# Patient Record
Sex: FEMALE | Race: BLACK OR AFRICAN AMERICAN | HISPANIC OR LATINO | URBAN - METROPOLITAN AREA
[De-identification: names, ages, dates, MRNs, and addresses within clinical notes are randomized per-mention and may not be internally consistent; named-entity substitution may affect disease eponyms.]

---

## 2018-07-12 ENCOUNTER — APPOINTMENT (RX ONLY)
Dept: URBAN - METROPOLITAN AREA OTHER 10 | Facility: OTHER | Age: 63
Setting detail: DERMATOLOGY
End: 2018-07-12

## 2018-07-12 DIAGNOSIS — L24 IRRITANT CONTACT DERMATITIS: ICD-10-CM

## 2018-07-12 DIAGNOSIS — L63.8 OTHER ALOPECIA AREATA: ICD-10-CM

## 2018-07-12 PROBLEM — L24.9 IRRITANT CONTACT DERMATITIS, UNSPECIFIED CAUSE: Status: ACTIVE | Noted: 2018-07-12

## 2018-07-12 PROCEDURE — 99202 OFFICE O/P NEW SF 15 MIN: CPT | Mod: 25

## 2018-07-12 PROCEDURE — 11900 INJECT SKIN LESIONS </W 7: CPT

## 2018-07-12 PROCEDURE — ? COUNSELING

## 2018-07-12 PROCEDURE — ? PRESCRIPTION

## 2018-07-12 PROCEDURE — ? INTRALESIONAL KENALOG

## 2018-07-12 PROCEDURE — ? TREATMENT REGIMEN

## 2018-07-12 RX ORDER — HYDROCORTISONE 25 MG/G
CREAM TOPICAL BID
Qty: 30 | Refills: 0 | Status: ERX | COMMUNITY
Start: 2018-07-12

## 2018-07-12 RX ORDER — TACROLIMUS 1 MG/G
OINTMENT TOPICAL BID
Qty: 100 | Refills: 0 | Status: ERX | COMMUNITY
Start: 2018-07-12

## 2018-07-12 RX ORDER — KETOCONAZOLE 20 MG/G
CREAM TOPICAL BID
Qty: 60 | Refills: 0 | Status: ERX | COMMUNITY
Start: 2018-07-12

## 2018-07-12 RX ADMIN — KETOCONAZOLE: 20 CREAM TOPICAL at 00:00

## 2018-07-12 RX ADMIN — HYDROCORTISONE: 25 CREAM TOPICAL at 00:00

## 2018-07-12 RX ADMIN — TACROLIMUS: 1 OINTMENT TOPICAL at 00:00

## 2018-07-12 ASSESSMENT — LOCATION SIMPLE DESCRIPTION DERM
LOCATION SIMPLE: SCALP
LOCATION SIMPLE: RIGHT OCCIPITAL SCALP
LOCATION SIMPLE: VULVA
LOCATION SIMPLE: POSTERIOR SCALP

## 2018-07-12 ASSESSMENT — LOCATION ZONE DERM
LOCATION ZONE: VULVA
LOCATION ZONE: SCALP

## 2018-07-12 ASSESSMENT — LOCATION DETAILED DESCRIPTION DERM
LOCATION DETAILED: LEFT LABIA MAJORA
LOCATION DETAILED: MID-OCCIPITAL SCALP
LOCATION DETAILED: RIGHT SUPERIOR OCCIPITAL SCALP
LOCATION DETAILED: LEFT SUPERIOR PARIETAL SCALP
LOCATION DETAILED: RIGHT LABIA MAJORA

## 2018-07-12 NOTE — PROCEDURE: INTRALESIONAL KENALOG
Expiration Date For Kenalog (Optional): 05/2018
Consent: The risks of atrophy were reviewed with the patient.
Administered By (Optional): Kirstie Hameed PA-C
Detail Level: Detailed
Total Volume (Ccs): 3.5
Kenalog Preparation: Kenalog
Include Z78.9 (Other Specified Conditions Influencing Health Status) As An Associated Diagnosis?: No
Ndc# For Kenalog Only: 4162-8420-64
Lot # For Kenalog (Optional): [id number]
Concentration Of Kenalog Solution Injected (Mg/Ml): 10.0
X Size Of Lesion In Cm (Optional): 0
Medical Necessity Clause: This procedure was medically necessary because the lesions that were treated were:
Treatment Number (Optional): 1

## 2018-07-12 NOTE — HPI: HAIR LOSS
Previous Labs: Yes
How Did The Hair Loss Occur?: gradual in onset
How Severe Is Your Hair Loss?: moderate
What Hair Products Do You Use?: Kirstie larson
When Were The Labs Drawn? (Drawn...): 1/18
Lab Details: Normal

## 2018-07-12 NOTE — PROCEDURE: TREATMENT REGIMEN
Plan: Pt was advised to check with PCP to ensure they checked for thyroid issues and iron deficiency at last physical pt was advised if they didn’t then to call clinic and we will fill out a lab form for pt\\n\\n\\nPt was advised to hold off on coloring hair for 8 weeks
Detail Level: Simple
Plan: [place] skin diet
Initiate Treatment: Hydrocortisone and Ketaconazole cream bid x 2 weeks on vagina area\\nProtopic once daily on vagina area til next visit

## 2018-07-30 ENCOUNTER — APPOINTMENT (RX ONLY)
Dept: URBAN - METROPOLITAN AREA OTHER 10 | Facility: OTHER | Age: 63
Setting detail: DERMATOLOGY
End: 2018-07-30

## 2018-07-30 DIAGNOSIS — L24 IRRITANT CONTACT DERMATITIS: ICD-10-CM

## 2018-07-30 PROBLEM — L24.9 IRRITANT CONTACT DERMATITIS, UNSPECIFIED CAUSE: Status: ACTIVE | Noted: 2018-07-30

## 2018-07-30 PROCEDURE — ? COUNSELING

## 2018-07-30 PROCEDURE — ? PRESCRIPTION

## 2018-07-30 PROCEDURE — ? TREATMENT REGIMEN

## 2018-07-30 PROCEDURE — 99213 OFFICE O/P EST LOW 20 MIN: CPT

## 2018-07-30 RX ORDER — PREDNISONE 10 MG/1
TABLET ORAL
Qty: 20 | Refills: 0 | Status: ERX | COMMUNITY
Start: 2018-07-30

## 2018-07-30 RX ORDER — HYDROCORTISONE 25 MG/G
CREAM TOPICAL BID
Qty: 30 | Refills: 1 | Status: ERX

## 2018-07-30 RX ORDER — KETOCONAZOLE 20 MG/G
CREAM TOPICAL BID
Qty: 60 | Refills: 0 | Status: ERX

## 2018-07-30 RX ADMIN — PREDNISONE: 10 TABLET ORAL at 00:00

## 2018-07-30 ASSESSMENT — LOCATION DETAILED DESCRIPTION DERM
LOCATION DETAILED: LEFT LABIA MAJORA
LOCATION DETAILED: RIGHT LABIA MAJORA

## 2018-07-30 ASSESSMENT — LOCATION ZONE DERM: LOCATION ZONE: VULVA

## 2018-07-30 ASSESSMENT — LOCATION SIMPLE DESCRIPTION DERM: LOCATION SIMPLE: VULVA

## 2018-07-30 NOTE — PROCEDURE: TREATMENT REGIMEN
Discontinue Regimen: Protopic
Otc Regimen: Vaseline or aquaphor on area daily
Plan: Pt was resolved then switched to Protopic and had an adverse reaction. Will treat accordingly and re-evaluate in 4 weeks
Detail Level: Simple
Modify Regimen: Once prednisone has calmed area start hydrocortisone and Ketaconazole cream bid x 2 weeks
Continue Regimen: [place] skin diet
Initiate Treatment: Prednisone taper

## 2018-08-13 ENCOUNTER — APPOINTMENT (RX ONLY)
Dept: URBAN - METROPOLITAN AREA OTHER 10 | Facility: OTHER | Age: 63
Setting detail: DERMATOLOGY
End: 2018-08-13

## 2018-08-13 DIAGNOSIS — L29.8 OTHER PRURITUS: ICD-10-CM

## 2018-08-13 DIAGNOSIS — L63.8 OTHER ALOPECIA AREATA: ICD-10-CM | Status: IMPROVED

## 2018-08-13 DIAGNOSIS — L29.89 OTHER PRURITUS: ICD-10-CM

## 2018-08-13 PROCEDURE — ? COUNSELING

## 2018-08-13 PROCEDURE — 11900 INJECT SKIN LESIONS </W 7: CPT

## 2018-08-13 PROCEDURE — ? TREATMENT REGIMEN

## 2018-08-13 PROCEDURE — 99213 OFFICE O/P EST LOW 20 MIN: CPT | Mod: 25

## 2018-08-13 PROCEDURE — ? PRESCRIPTION

## 2018-08-13 PROCEDURE — ? INTRALESIONAL KENALOG

## 2018-08-13 RX ORDER — CLOBETASOL PROPIONATE 0.05 %
SHAMPOO TOPICAL
Qty: 118 | Refills: 2 | Status: ERX | COMMUNITY
Start: 2018-08-13

## 2018-08-13 RX ADMIN — Medication: at 00:00

## 2018-08-13 ASSESSMENT — LOCATION SIMPLE DESCRIPTION DERM
LOCATION SIMPLE: RIGHT OCCIPITAL SCALP
LOCATION SIMPLE: SCALP
LOCATION SIMPLE: POSTERIOR SCALP

## 2018-08-13 ASSESSMENT — LOCATION ZONE DERM: LOCATION ZONE: SCALP

## 2018-08-13 ASSESSMENT — LOCATION DETAILED DESCRIPTION DERM
LOCATION DETAILED: RIGHT SUPERIOR OCCIPITAL SCALP
LOCATION DETAILED: MID-OCCIPITAL SCALP
LOCATION DETAILED: LEFT SUPERIOR PARIETAL SCALP

## 2018-08-13 NOTE — PROCEDURE: TREATMENT REGIMEN
Detail Level: Simple
Plan: Pt was advised to check with PCP to ensure they checked for thyroid issues and iron deficiency at last physical pt was advised if they didn’t then to call clinic and we will fill out a lab form for pt\\n\\n
Otc Regimen: Pt already using sulfate free products.\\nGave pt a handout with ppd and advised to avoid hair dye with that chemical.
Plan: Pt states that pruritis usually occurs following sweating. There is no rash as of today. Pt already has a sulfate free regimen. Discussed possible allergy to ppd, but with no cutaneous evidence it is less likely that it is contact related. Will treat accordingly and re-evaluate.
Initiate Treatment: Clobex shampoo: wash scalp when irritated, up to 3-4 times per week

## 2018-08-13 NOTE — PROCEDURE: INTRALESIONAL KENALOG
Lot # For Kenalog (Optional): [id number]
Total Volume (Ccs): 0.6
Consent: The risks of atrophy were reviewed with the patient.
X Size Of Lesion In Cm (Optional): 0
Administered By (Optional): Kirstie Hameed PA-C
Treatment Number (Optional): 1
Expiration Date For Kenalog (Optional): 12/2019
Medical Necessity Clause: This procedure was medically necessary because the lesions that were treated were:
Detail Level: Detailed
Include Z78.9 (Other Specified Conditions Influencing Health Status) As An Associated Diagnosis?: No
Concentration Of Kenalog Solution Injected (Mg/Ml): 10.0
Ndc# For Kenalog Only: 0095-6195-26
Kenalog Preparation: Kenalog

## 2021-10-24 ENCOUNTER — LAB OUTSIDE AN ENCOUNTER (OUTPATIENT)
Dept: URBAN - METROPOLITAN AREA CLINIC 124 | Facility: CLINIC | Age: 66
End: 2021-10-24

## 2021-10-25 ENCOUNTER — OFFICE VISIT (OUTPATIENT)
Dept: URBAN - METROPOLITAN AREA CLINIC 124 | Facility: CLINIC | Age: 66
End: 2021-10-25
Payer: MEDICARE

## 2021-10-25 VITALS
WEIGHT: 153.2 LBS | BODY MASS INDEX: 26.15 KG/M2 | TEMPERATURE: 97.2 F | DIASTOLIC BLOOD PRESSURE: 95 MMHG | HEIGHT: 64 IN | SYSTOLIC BLOOD PRESSURE: 153 MMHG | HEART RATE: 76 BPM

## 2021-10-25 DIAGNOSIS — R05.3 CHRONIC COUGH: ICD-10-CM

## 2021-10-25 DIAGNOSIS — Z12.11 COLON CANCER SCREENING: ICD-10-CM

## 2021-10-25 DIAGNOSIS — G47.30 SLEEP APNEA, UNSPECIFIED TYPE: ICD-10-CM

## 2021-10-25 DIAGNOSIS — K21.9 GASTROESOPHAGEAL REFLUX DISEASE, UNSPECIFIED WHETHER ESOPHAGITIS PRESENT: ICD-10-CM

## 2021-10-25 DIAGNOSIS — Z83.71 FAMILY HISTORY OF COLONIC POLYPS: ICD-10-CM

## 2021-10-25 DIAGNOSIS — K59.04 CHRONIC IDIOPATHIC CONSTIPATION: ICD-10-CM

## 2021-10-25 PROCEDURE — 99204 OFFICE O/P NEW MOD 45 MIN: CPT | Performed by: INTERNAL MEDICINE

## 2021-10-25 RX ORDER — PANTOPRAZOLE SODIUM 40 MG/1
1 TABLET TABLET, DELAYED RELEASE ORAL ONCE A DAY
Qty: 90 TABLET | Refills: 3 | OUTPATIENT
Start: 2021-10-25

## 2021-10-25 RX ORDER — CYCLOBENZAPRINE HYDROCHLORIDE 10 MG/1
TABLET, FILM COATED ORAL
Qty: 0 | Refills: 0 | COMMUNITY
Start: 1900-01-01

## 2021-10-25 RX ORDER — HYDROCODONE BITARTRATE AND ACETAMINOPHEN 7.5; 325 MG/15ML; MG/15ML
SOLUTION ORAL
Qty: 0 | Refills: 0 | COMMUNITY
Start: 1900-01-01

## 2021-10-25 RX ORDER — ALPRAZOLAM 0.5 MG/1
TABLET ORAL
Qty: 0 | Refills: 0 | COMMUNITY
Start: 1900-01-01

## 2021-10-25 RX ORDER — POLYETHYLENE GLYOCOL 3350, SODIUM CHLORIDE, SODIUM BICARBONATE AND POTASSIUM CHLORIDE 420; 11.2; 5.72; 1.48 G/4L; G/4L; G/4L; G/4L
AS DIRECTED POWDER, FOR SOLUTION NASOGASTRIC; ORAL AS DIRECTED
Qty: 420 GRAM | Refills: 0 | OUTPATIENT
Start: 2021-10-24 | End: 2021-10-25

## 2021-10-25 RX ORDER — ONDANSETRON HYDROCHLORIDE 4 MG/1
1 TABLET AS NEEDED TABLET, FILM COATED ORAL ONCE A DAY
Qty: 2 | Refills: 0 | OUTPATIENT
Start: 2021-10-24

## 2021-10-25 RX ORDER — ZOLPIDEM TARTRATE 5 MG/1
TABLET, FILM COATED ORAL
Qty: 0 | Refills: 0 | COMMUNITY
Start: 1900-01-01

## 2021-10-25 NOTE — HPI-TODAY'S VISIT:
This is a 65-year-old female referred by Dr Andrews (A[facility])  for a GI consultation of colon cancer screening and GERD and a copy of this note was sent to the referring provider.  Upon review the chart, patient has been seeing Dr. Cb Hong and last saw him in 2016 for atypical chest pain and GERD.  She had been on Nexium in the past for GERD and was using a sleep machine at night for sleep apnea but complaining of chronic cough..  Patient also had chronic constipation.  Patient had an EGD on July 5, 2016 that revealed some gastric erythema and erythema of the lower esophagus biopsies were taken.  Patient had a colonoscopy as well on the same day for first-degree relative with history of polyps and constipation and this revealed internal hemorrhoids otherwise normal exam Dr. Hong recommended repeat exam in 5 years.  Biopsy of the stomach showed reactive gastropathy but no H. pylori and esophageal biopsy was normal.  Patient also had a barium swallow on September 20, 2016 that was normal.  CT scan of the abdomen was done on August 9, 2016 which revealed a normal gallbladder, normal liver, normal pancreas. Pt say she goes about every other day in terms of her bowels. Pt says the cough comes and goes. This year she saw ENT and pulmonary doctors. ENT did a full work up and negative. Pulmonary will f/u on a lung nodule. Pt has severe allergies and is being treated for this. Pt is much better on pantoprazole now.

## 2022-03-03 ENCOUNTER — OFFICE VISIT (OUTPATIENT)
Dept: URBAN - METROPOLITAN AREA SURGERY CENTER 16 | Facility: SURGERY CENTER | Age: 67
End: 2022-03-03

## 2022-08-18 ENCOUNTER — WEB ENCOUNTER (OUTPATIENT)
Dept: URBAN - METROPOLITAN AREA CLINIC 74 | Facility: CLINIC | Age: 67
End: 2022-08-18

## 2022-08-18 ENCOUNTER — OFFICE VISIT (OUTPATIENT)
Dept: URBAN - METROPOLITAN AREA CLINIC 74 | Facility: CLINIC | Age: 67
End: 2022-08-18
Payer: MEDICARE

## 2022-08-18 ENCOUNTER — LAB OUTSIDE AN ENCOUNTER (OUTPATIENT)
Dept: URBAN - METROPOLITAN AREA CLINIC 74 | Facility: CLINIC | Age: 67
End: 2022-08-18

## 2022-08-18 VITALS
SYSTOLIC BLOOD PRESSURE: 178 MMHG | HEIGHT: 64 IN | WEIGHT: 159.2 LBS | HEART RATE: 104 BPM | BODY MASS INDEX: 27.18 KG/M2 | DIASTOLIC BLOOD PRESSURE: 104 MMHG | OXYGEN SATURATION: 98 % | TEMPERATURE: 97.6 F

## 2022-08-18 DIAGNOSIS — K59.04 CHRONIC IDIOPATHIC CONSTIPATION: ICD-10-CM

## 2022-08-18 DIAGNOSIS — G47.30 SLEEP APNEA, UNSPECIFIED TYPE: ICD-10-CM

## 2022-08-18 DIAGNOSIS — Z83.71 FAMILY HISTORY OF COLONIC POLYPS: ICD-10-CM

## 2022-08-18 DIAGNOSIS — Z12.11 COLON CANCER SCREENING: ICD-10-CM

## 2022-08-18 DIAGNOSIS — K21.9 GASTROESOPHAGEAL REFLUX DISEASE, UNSPECIFIED WHETHER ESOPHAGITIS PRESENT: ICD-10-CM

## 2022-08-18 DIAGNOSIS — R05.3 CHRONIC COUGH: ICD-10-CM

## 2022-08-18 PROCEDURE — 99214 OFFICE O/P EST MOD 30 MIN: CPT | Performed by: INTERNAL MEDICINE

## 2022-08-18 RX ORDER — ONDANSETRON HYDROCHLORIDE 4 MG/1
1 TABLET AS NEEDED TABLET, FILM COATED ORAL ONCE A DAY
Qty: 2 | Refills: 0 | Status: DISCONTINUED | COMMUNITY
Start: 2021-10-24

## 2022-08-18 RX ORDER — METHYLPREDNISOLONE 4 MG/1
AS DIRECTED TABLET ORAL
Qty: 1 PACK | Refills: 1 | OUTPATIENT
Start: 2022-08-18 | End: 2022-08-28

## 2022-08-18 RX ORDER — BENZONATATE 100 MG/1
1 CAPSULE AS NEEDED CAPSULE ORAL THREE TIMES A DAY
Qty: 90 | Refills: 2 | OUTPATIENT
Start: 2022-08-18 | End: 2022-11-15

## 2022-08-18 RX ORDER — ZOLPIDEM TARTRATE 5 MG/1
TABLET, FILM COATED ORAL
Qty: 0 | Refills: 0 | Status: DISCONTINUED | COMMUNITY
Start: 1900-01-01

## 2022-08-18 RX ORDER — FLUTICASONE PROPIONATE 50 UG/1
1 SPRAY IN EACH NOSTRIL SPRAY, METERED NASAL ONCE A DAY
Status: ACTIVE | COMMUNITY

## 2022-08-18 RX ORDER — PANTOPRAZOLE SODIUM 40 MG/1
1 TABLET TABLET, DELAYED RELEASE ORAL ONCE A DAY
Qty: 90 TABLET | Refills: 3 | Status: ACTIVE | COMMUNITY
Start: 2021-10-25

## 2022-08-18 RX ORDER — HYDROCODONE BITARTRATE AND ACETAMINOPHEN 7.5; 325 MG/15ML; MG/15ML
SOLUTION ORAL
Qty: 0 | Refills: 0 | Status: DISCONTINUED | COMMUNITY
Start: 1900-01-01

## 2022-08-18 RX ORDER — ALPRAZOLAM 0.5 MG/1
TABLET ORAL
Qty: 0 | Refills: 0 | Status: DISCONTINUED | COMMUNITY
Start: 1900-01-01

## 2022-08-18 RX ORDER — CYCLOBENZAPRINE HYDROCHLORIDE 10 MG/1
TABLET, FILM COATED ORAL
Qty: 0 | Refills: 0 | Status: DISCONTINUED | COMMUNITY
Start: 1900-01-01

## 2022-08-18 NOTE — HPI-TODAY'S VISIT:
This is a 66-year-old female referred by Dr Allen for a GI consultation of colon cancer screening and GERD and a copy of this note was sent to the referring provider.  She had been on Nexium in the past for GERD and was using a CPAP machine at night for sleep apnea but complaining of chronic cough..  Patient also had chronic constipation.  Patient had an EGD on July 5, 2016 that revealed some gastric erythema and erythema of the lower esophagus biopsies were taken.  Patient had a colonoscopy as well on the same day for first-degree relative with history of polyps and constipation and this revealed internal hemorrhoids otherwise normal exam Dr. Hong recommended repeat exam in 5 years.  Biopsy of the stomach showed reactive gastropathy but no H. pylori and esophageal biopsy was normal.  Patient also had a barium swallow on September 20, 2016 that was normal.  CT scan of the abdomen was done on August 9, 2016 which revealed a normal gallbladder, normal liver, normal pancreas. Pt say she goes about every other day in terms of her bowels.Has chronic cough. very frustrated about it. This year she saw ENT and pulmonary doctors. ENT did a full work up and negative. Pulmonary will f/u on a lung nodule. Pt has severe allergies and is being treated for this. protonix helps GERD, but cough persists

## 2022-08-30 ENCOUNTER — TELEPHONE ENCOUNTER (OUTPATIENT)
Dept: URBAN - METROPOLITAN AREA CLINIC 74 | Facility: CLINIC | Age: 67
End: 2022-08-30

## 2022-10-03 ENCOUNTER — LAB OUTSIDE AN ENCOUNTER (OUTPATIENT)
Dept: URBAN - METROPOLITAN AREA CLINIC 74 | Facility: CLINIC | Age: 67
End: 2022-10-03

## 2022-10-03 ENCOUNTER — TELEPHONE ENCOUNTER (OUTPATIENT)
Dept: URBAN - METROPOLITAN AREA CLINIC 74 | Facility: CLINIC | Age: 67
End: 2022-10-03

## 2022-10-03 ENCOUNTER — OFFICE VISIT (OUTPATIENT)
Dept: URBAN - METROPOLITAN AREA CLINIC 74 | Facility: CLINIC | Age: 67
End: 2022-10-03
Payer: MEDICARE

## 2022-10-03 VITALS
TEMPERATURE: 97 F | HEIGHT: 64 IN | HEART RATE: 101 BPM | BODY MASS INDEX: 27.31 KG/M2 | SYSTOLIC BLOOD PRESSURE: 142 MMHG | OXYGEN SATURATION: 99 % | WEIGHT: 160 LBS | DIASTOLIC BLOOD PRESSURE: 82 MMHG

## 2022-10-03 DIAGNOSIS — Z83.71 FAMILY HISTORY OF COLONIC POLYPS: ICD-10-CM

## 2022-10-03 DIAGNOSIS — K59.04 CHRONIC IDIOPATHIC CONSTIPATION: ICD-10-CM

## 2022-10-03 DIAGNOSIS — R05.3 CHRONIC COUGH: ICD-10-CM

## 2022-10-03 DIAGNOSIS — G47.30 SLEEP APNEA, UNSPECIFIED TYPE: ICD-10-CM

## 2022-10-03 DIAGNOSIS — Z12.11 COLON CANCER SCREENING: ICD-10-CM

## 2022-10-03 DIAGNOSIS — K21.9 GASTROESOPHAGEAL REFLUX DISEASE, UNSPECIFIED WHETHER ESOPHAGITIS PRESENT: ICD-10-CM

## 2022-10-03 PROBLEM — 82934008: Status: ACTIVE | Noted: 2021-10-24

## 2022-10-03 PROBLEM — 73430006: Status: ACTIVE | Noted: 2021-10-25

## 2022-10-03 PROCEDURE — 99214 OFFICE O/P EST MOD 30 MIN: CPT | Performed by: INTERNAL MEDICINE

## 2022-10-03 RX ORDER — PANTOPRAZOLE SODIUM 40 MG/1
1 TABLET TABLET, DELAYED RELEASE ORAL ONCE A DAY
Qty: 90 TABLET | Refills: 3 | Status: ACTIVE | COMMUNITY
Start: 2021-10-25

## 2022-10-03 RX ORDER — FLUTICASONE PROPIONATE 50 UG/1
1 SPRAY IN EACH NOSTRIL SPRAY, METERED NASAL ONCE A DAY
Status: ACTIVE | COMMUNITY

## 2022-10-03 RX ORDER — BENZONATATE 100 MG/1
1 CAPSULE AS NEEDED CAPSULE ORAL THREE TIMES A DAY
Qty: 90 | Refills: 2 | Status: ON HOLD | COMMUNITY
Start: 2022-08-18 | End: 2022-11-15

## 2022-10-03 NOTE — HPI-TODAY'S VISIT:
This is a 66-year-old female referred by Dr Allen for a GI consultation of colon cancer screening and GERD and a copy of this note was sent to the referring provider.  She had been on Nexium in the past for GERD and was using a CPAP machine at night for sleep apnea but complaining of chronic cough..  Patient also had chronic constipation.  Patient had an EGD on July 5, 2016 that revealed some gastric erythema and erythema of the lower esophagus biopsies were taken.  Patient had a colonoscopy as well on the same day for first-degree relative with history of polyps and constipation and this revealed internal hemorrhoids otherwise normal exam Dr. Hong recommended repeat exam in 5 years.  Biopsy of the stomach showed reactive gastropathy but no H. pylori and esophageal biopsy was normal.  Patient also had a barium swallow on September 20, 2016 that was normal.  CT scan of the abdomen was done on August 9, 2016 which revealed a normal gallbladder, normal liver, normal pancreas. Pt say she goes about every other day in terms of her bowels.Has chronic cough. This year she saw ENT and pulmonary doctors. ENT did a full work up and negative. Pulmonary will f/u on a lung nodule. Pt has severe allergies and is being treated for this. protonix helps GERD. cough has improved a lot after medrol dosepak. not needing tessalon perles

## 2022-10-10 ENCOUNTER — TELEPHONE ENCOUNTER (OUTPATIENT)
Dept: URBAN - METROPOLITAN AREA SURGERY CENTER 30 | Facility: SURGERY CENTER | Age: 67
End: 2022-10-10

## 2022-10-24 ENCOUNTER — APPOINTMENT (RX ONLY)
Dept: URBAN - METROPOLITAN AREA OTHER 10 | Facility: OTHER | Age: 67
Setting detail: DERMATOLOGY
End: 2022-10-24

## 2022-10-24 DIAGNOSIS — L67.8 OTHER HAIR COLOR AND HAIR SHAFT ABNORMALITIES: ICD-10-CM

## 2022-10-24 PROCEDURE — 99213 OFFICE O/P EST LOW 20 MIN: CPT

## 2022-10-24 PROCEDURE — ? COUNSELING

## 2022-10-24 PROCEDURE — ? TREATMENT REGIMEN

## 2022-10-24 ASSESSMENT — LOCATION SIMPLE DESCRIPTION DERM: LOCATION SIMPLE: HAIR

## 2022-10-24 ASSESSMENT — LOCATION ZONE DERM: LOCATION ZONE: SCALP

## 2022-10-24 ASSESSMENT — LOCATION DETAILED DESCRIPTION DERM: LOCATION DETAILED: HAIR

## 2022-10-24 NOTE — PROCEDURE: COUNSELING
Detail Level: Zone
Patient Specific Counseling (Will Not Stick From Patient To Patient): Advised patient that this is likely due to over processing with chemical perm, keratin treatments and excessive heat application.  Instructed to avoid further chemical treatment or the application of high heat to the hair.

## 2022-10-24 NOTE — PROCEDURE: TREATMENT REGIMEN
Detail Level: Zone
Plan: Hair skin and nail vitamins, Vit D 1000 iu daily. increase protein intake (vital protein powder ) once daily, hair protein treatments( apoghee).  Wait 8-12 weeks before coloring.

## 2022-10-27 ENCOUNTER — OFFICE VISIT (OUTPATIENT)
Dept: URBAN - METROPOLITAN AREA SURGERY CENTER 30 | Facility: SURGERY CENTER | Age: 67
End: 2022-10-27
Payer: MEDICARE

## 2022-10-27 ENCOUNTER — CLAIMS CREATED FROM THE CLAIM WINDOW (OUTPATIENT)
Dept: URBAN - METROPOLITAN AREA CLINIC 4 | Facility: CLINIC | Age: 67
End: 2022-10-27
Payer: MEDICARE

## 2022-10-27 DIAGNOSIS — K63.5 BENIGN COLON POLYP: ICD-10-CM

## 2022-10-27 DIAGNOSIS — Z12.11 COLON CANCER SCREENING: ICD-10-CM

## 2022-10-27 DIAGNOSIS — K21.9 ACID REFLUX: ICD-10-CM

## 2022-10-27 DIAGNOSIS — K31.89 ACQUIRED DEFORMITY OF DUODENUM: ICD-10-CM

## 2022-10-27 DIAGNOSIS — K21.9 GASTRO-ESOPHAGEAL REFLUX DISEASE WITHOUT ESOPHAGITIS: ICD-10-CM

## 2022-10-27 DIAGNOSIS — K29.70 GASTRITIS, UNSPECIFIED, WITHOUT BLEEDING: ICD-10-CM

## 2022-10-27 PROCEDURE — 88305 TISSUE EXAM BY PATHOLOGIST: CPT | Performed by: PATHOLOGY

## 2022-10-27 PROCEDURE — 88313 SPECIAL STAINS GROUP 2: CPT | Performed by: PATHOLOGY

## 2022-10-27 PROCEDURE — 45380 COLONOSCOPY AND BIOPSY: CPT | Performed by: INTERNAL MEDICINE

## 2022-10-27 PROCEDURE — G8907 PT DOC NO EVENTS ON DISCHARG: HCPCS | Performed by: INTERNAL MEDICINE

## 2022-10-27 PROCEDURE — 43239 EGD BIOPSY SINGLE/MULTIPLE: CPT | Performed by: INTERNAL MEDICINE

## 2023-02-27 ENCOUNTER — OFFICE VISIT (OUTPATIENT)
Dept: URBAN - METROPOLITAN AREA CLINIC 74 | Facility: CLINIC | Age: 68
End: 2023-02-27

## 2023-03-22 ENCOUNTER — TELEPHONE ENCOUNTER (OUTPATIENT)
Dept: URBAN - METROPOLITAN AREA CLINIC 74 | Facility: CLINIC | Age: 68
End: 2023-03-22

## 2023-04-03 ENCOUNTER — OFFICE VISIT (OUTPATIENT)
Dept: URBAN - METROPOLITAN AREA CLINIC 74 | Facility: CLINIC | Age: 68
End: 2023-04-03

## 2023-04-10 ENCOUNTER — OFFICE VISIT (OUTPATIENT)
Dept: URBAN - METROPOLITAN AREA CLINIC 74 | Facility: CLINIC | Age: 68
End: 2023-04-10

## 2023-05-25 PROBLEM — 398311004: Status: ACTIVE | Noted: 2023-05-25

## 2023-05-25 PROBLEM — 266435005: Status: ACTIVE | Noted: 2023-05-25

## 2023-05-25 PROBLEM — 68154008: Status: ACTIVE | Noted: 2023-05-25

## 2023-05-25 PROBLEM — 89452002: Status: ACTIVE | Noted: 2023-05-25

## 2023-05-25 PROBLEM — 8493009: Status: ACTIVE | Noted: 2023-05-25

## 2023-06-01 ENCOUNTER — OFFICE VISIT (OUTPATIENT)
Dept: URBAN - METROPOLITAN AREA CLINIC 74 | Facility: CLINIC | Age: 68
End: 2023-06-01
Payer: MEDICARE

## 2023-06-01 ENCOUNTER — LAB OUTSIDE AN ENCOUNTER (OUTPATIENT)
Dept: URBAN - METROPOLITAN AREA CLINIC 74 | Facility: CLINIC | Age: 68
End: 2023-06-01

## 2023-06-01 VITALS
HEIGHT: 64 IN | BODY MASS INDEX: 26.12 KG/M2 | SYSTOLIC BLOOD PRESSURE: 122 MMHG | TEMPERATURE: 97.3 F | HEART RATE: 86 BPM | DIASTOLIC BLOOD PRESSURE: 80 MMHG | WEIGHT: 153 LBS

## 2023-06-01 DIAGNOSIS — K57.30 DIVERTICULOSIS OF COLON WITHOUT DIVERTICULITIS: ICD-10-CM

## 2023-06-01 DIAGNOSIS — K63.5 HYPERPLASTIC POLYP OF TRANSVERSE COLON: ICD-10-CM

## 2023-06-01 DIAGNOSIS — G47.30 SLEEP APNEA, UNSPECIFIED TYPE: ICD-10-CM

## 2023-06-01 DIAGNOSIS — K21.9 GERD WITHOUT ESOPHAGITIS: ICD-10-CM

## 2023-06-01 DIAGNOSIS — K29.50 CHRONIC GASTRITIS WITHOUT BLEEDING, UNSPECIFIED GASTRITIS TYPE: ICD-10-CM

## 2023-06-01 DIAGNOSIS — E66.3 OVERWEIGHT (BMI 25.0-29.9): ICD-10-CM

## 2023-06-01 DIAGNOSIS — Z83.71 FAMILY HISTORY OF COLONIC POLYPS: ICD-10-CM

## 2023-06-01 DIAGNOSIS — R05.3 CHRONIC COUGH: ICD-10-CM

## 2023-06-01 DIAGNOSIS — K76.9 LIVER LESION: ICD-10-CM

## 2023-06-01 PROBLEM — 162863004: Status: ACTIVE | Noted: 2023-06-01

## 2023-06-01 PROBLEM — 300331000: Status: ACTIVE | Noted: 2023-06-01

## 2023-06-01 PROCEDURE — 99213 OFFICE O/P EST LOW 20 MIN: CPT | Performed by: INTERNAL MEDICINE

## 2023-06-01 RX ORDER — FLUOXETINE HYDROCHLORIDE 40 MG/1
1 CAPSULE CAPSULE ORAL ONCE A DAY
Status: ACTIVE | COMMUNITY

## 2023-06-01 RX ORDER — FLUTICASONE PROPIONATE 50 UG/1
1 SPRAY IN EACH NOSTRIL SPRAY, METERED NASAL ONCE A DAY
Status: ACTIVE | COMMUNITY

## 2023-06-01 RX ORDER — PANTOPRAZOLE SODIUM 40 MG/1
1 TABLET TABLET, DELAYED RELEASE ORAL ONCE A DAY
Qty: 90 | Refills: 3
Start: 2021-10-25

## 2023-06-01 RX ORDER — ALPRAZOLAM 0.5 MG/1
1 TABLET TABLET ORAL TWICE A DAY
Status: ACTIVE | COMMUNITY

## 2023-06-01 NOTE — HPI-TODAY'S VISIT:
This is a 66-year-old female referred by Dr Allen for a GI consultation of colon cancer screening and GERD and a copy of this note was sent to the referring provider.  She had been on Nexium in the past for GERD and was using a CPAP machine at night for sleep apnea but complaining of chronic cough..  Patient also had chronic constipation.  Patient had an EGD on July 5, 2016 that revealed some gastric erythema and erythema of the lower esophagus biopsies were taken.  Patient had a colonoscopy as well on the same day for first-degree relative with history of polyps and constipation and this revealed internal hemorrhoids otherwise normal exam Dr. Hong recommended repeat exam in 5 years.  Biopsy of the stomach showed reactive gastropathy but no H. pylori and esophageal biopsy was normal.  Patient also had a barium swallow on September 20, 2016 that was normal.  CT scan of the abdomen was done on August 9, 2016 which revealed a normal gallbladder, normal liver, normal pancreas. Pt say she goes about every other day in terms of her bowels.Has chronic cough. This year she saw ENT and pulmonary doctors. ENT did a full work up and negative. Pulmonary will f/u on a lung nodule. Pt has severe allergies and is being treated for this. protonix helps GERD. cough has improved a lot after medrol dosepak. not needing tessalon perles  Today June 1, 2023 the patient returns for a follow-up visit, the patient was last seen by Dr. Parish on October 3, 2022 with gastroesophageal reflux, chronic cough, sleep apnea, chronic idiopathic constipation, family history of colon polyps, the patient was to be scheduled for a colon cancer screening.  The patient has been treated with Nexium for GERD and was using the CPAP machine at night for sleep apnea but did complain of chronic cough.  The patient had chronic constipation.  The patient had an EGD on July 5, 2016 which revealed gastric erythema, erythema in the lower esophagus, biopsies were taken.  The patient was found to have reactive gastropathy H. pylori negative and the esophageal biopsies were normal.  The patient had a colonoscopy on the same day which revealed internal hemorrhoids.  At that time the patient was advised to get a colonoscopy in 5 years.  A barium swallow performed September 20, 2016 was normal.  A CT scan of the abdomen performed on August 9, 2016 revealed a normal gallbladder, normal liver, normal pancreas.  The patient had been seen by ENT and pulmonary medicine for chronic cough and the work-up was negative.  The patient had severe allergies and was being treated and along with it was taking Protonix for GERD.  The patient was suspected to have a multifactorial cough possibly allergies and bronchial spasm, in the past neutral Dosepak helped.  The patient was to be scheduled for an EGD and a colonoscopy.  The patient's EGD performed on October 27, 2022 revealed a 1 cm hiatal hernia mild chemical reactive gastropathy H. pylori negative and negative for intestinal metaplasia, squamocolumnar mucosa with reflux type changes.  On the same day the patient had a colonoscopy which revealed a 5 mm transverse sessile polyp which was hyperplastic.  The patient was found to have small nonbleeding internal hemorrhoids and sigmoid, descending colon and transverse colon diverticulosis with no evidence of diverticulitis or bleeding.  Today the patient returns to the office to get results from the previous colonoscopy and EGD performed by Dr. Parish.  The patient was made aware that on the EGD she was found to have a hiatal hernia, mild chemical reactive gastropathy H. pylori negative and negative for intestinal metaplasia, the esophagus showed reflux type changes with no evidence of Rojas's.  The colonoscopy revealed a 5 mm transverse sessile hyperplastic polyp, small nonbleeding internal hemorrhoids and diverticulosis throughout the sigmoid descending and transverse colon.  The patient states that as long as she follows antireflux measures and diet and takes pantoprazole 40 mg daily she has no acid reflux.  Otherwise she becomes symptomatic.  The patient denies having any dysphagia, odynophagia, there was no nausea or vomiting.  There was no evidence of GI bleeding.  Bowel movements remain type IV on the [place] scale, there is no blood in the stool.  The patient was advised to follow antireflux measures and diet, continue to take pantoprazole 40 mg daily.  The patient's next colonoscopy will be due in 2027 in view of the family history of colonic polyps, she will remain on a high-fiber diet.  The patient inquired about her 2016 liver testing where she was found to have either a liver cyst or hemangioma, there has been no follow-up appointment, the patient will be scheduled to have an ultrasound of the right upper quadrant, she will be contacted with reports and recommendations.  As long as doing well the patient will return for follow-up visit in 1 year or as needed.

## 2023-06-06 ENCOUNTER — OFFICE VISIT (OUTPATIENT)
Dept: URBAN - METROPOLITAN AREA CLINIC 73 | Facility: CLINIC | Age: 68
End: 2023-06-06

## 2023-06-13 ENCOUNTER — OFFICE VISIT (OUTPATIENT)
Dept: URBAN - METROPOLITAN AREA CLINIC 73 | Facility: CLINIC | Age: 68
End: 2023-06-13

## 2023-06-20 ENCOUNTER — OFFICE VISIT (OUTPATIENT)
Dept: URBAN - METROPOLITAN AREA CLINIC 73 | Facility: CLINIC | Age: 68
End: 2023-06-20
Payer: MEDICARE

## 2023-06-20 DIAGNOSIS — K76.89 HEPATIC CYST: ICD-10-CM

## 2023-06-20 PROCEDURE — 76705 ECHO EXAM OF ABDOMEN: CPT | Performed by: INTERNAL MEDICINE

## 2023-08-09 ENCOUNTER — APPOINTMENT (RX ONLY)
Dept: URBAN - METROPOLITAN AREA OTHER 10 | Facility: OTHER | Age: 68
Setting detail: DERMATOLOGY
End: 2023-08-09

## 2023-08-09 DIAGNOSIS — B07.8 OTHER VIRAL WARTS: ICD-10-CM

## 2023-08-09 DIAGNOSIS — L67.8 OTHER HAIR COLOR AND HAIR SHAFT ABNORMALITIES: ICD-10-CM | Status: RESOLVED

## 2023-08-09 PROCEDURE — ? COUNSELING

## 2023-08-09 PROCEDURE — 17110 DESTRUCTION B9 LES UP TO 14: CPT

## 2023-08-09 PROCEDURE — 99212 OFFICE O/P EST SF 10 MIN: CPT | Mod: 25

## 2023-08-09 PROCEDURE — ? LIQUID NITROGEN

## 2023-08-09 ASSESSMENT — LOCATION SIMPLE DESCRIPTION DERM
LOCATION SIMPLE: HAIR
LOCATION SIMPLE: RIGHT HAND
LOCATION SIMPLE: LEFT HAND

## 2023-08-09 ASSESSMENT — LOCATION ZONE DERM
LOCATION ZONE: SCALP
LOCATION ZONE: HAND

## 2023-08-09 ASSESSMENT — LOCATION DETAILED DESCRIPTION DERM
LOCATION DETAILED: HAIR
LOCATION DETAILED: LEFT THENAR EMINENCE
LOCATION DETAILED: RIGHT THENAR EMINENCE

## 2023-08-09 NOTE — PROCEDURE: LIQUID NITROGEN
Number Of Freeze-Thaw Cycles: 2 freeze-thaw cycles
Post-Care Instructions: I reviewed with the patient in detail post-care instructions. Patient is to wear sunprotection, and avoid picking at any of the treated lesions. Pt may apply Vaseline to crusted or scabbing areas.
Render Note In Bullet Format When Appropriate: No
Show Applicator Variable?: Yes
Medical Necessity Clause: This procedure was medically necessary because the lesions that were treated were: Inflamed
Detail Level: Detailed
Pared With?: 15 blade
Medical Necessity Information: It is in your best interest to select a reason for this procedure from the list below. All of these items fulfill various CMS LCD requirements except the new and changing color options.
Spray Paint Text: The liquid nitrogen was applied to the skin utilizing a spray paint frosting technique.
Consent: The patient's consent was obtained including but not limited to risks of crusting, scabbing, blistering, scarring, darker or lighter pigmentary change, recurrence, incomplete removal and infection.
Duration Of Freeze Thaw-Cycle (Seconds): 5

## 2023-11-01 PROBLEM — 95570007: Status: ACTIVE | Noted: 2023-11-01

## 2023-11-06 ENCOUNTER — OFFICE VISIT (OUTPATIENT)
Dept: URBAN - METROPOLITAN AREA CLINIC 74 | Facility: CLINIC | Age: 68
End: 2023-11-06
Payer: MEDICARE

## 2023-11-06 ENCOUNTER — LAB OUTSIDE AN ENCOUNTER (OUTPATIENT)
Dept: URBAN - METROPOLITAN AREA CLINIC 74 | Facility: CLINIC | Age: 68
End: 2023-11-06

## 2023-11-06 VITALS
HEIGHT: 64 IN | TEMPERATURE: 97.2 F | WEIGHT: 163 LBS | BODY MASS INDEX: 27.83 KG/M2 | HEART RATE: 70 BPM | DIASTOLIC BLOOD PRESSURE: 80 MMHG | OXYGEN SATURATION: 98 % | SYSTOLIC BLOOD PRESSURE: 130 MMHG

## 2023-11-06 DIAGNOSIS — Z83.718 FAMILY HISTORY OF FAMILIAL POLYPOSIS: ICD-10-CM

## 2023-11-06 DIAGNOSIS — K57.30 DIVERTICULOSIS OF COLON WITHOUT DIVERTICULITIS: ICD-10-CM

## 2023-11-06 DIAGNOSIS — K21.9 GERD WITHOUT ESOPHAGITIS: ICD-10-CM

## 2023-11-06 DIAGNOSIS — K29.40 ATROPHIC GASTRITIS: ICD-10-CM

## 2023-11-06 PROCEDURE — 99213 OFFICE O/P EST LOW 20 MIN: CPT | Performed by: INTERNAL MEDICINE

## 2023-11-06 RX ORDER — FLUOXETINE HYDROCHLORIDE 40 MG/1
1 CAPSULE CAPSULE ORAL ONCE A DAY
Status: ACTIVE | COMMUNITY

## 2023-11-06 RX ORDER — TAMSULOSIN HYDROCHLORIDE 0.4 MG/1
1 CAPSULE CAPSULE ORAL ONCE A DAY
Status: ACTIVE | COMMUNITY

## 2023-11-06 RX ORDER — ALPRAZOLAM 0.5 MG/1
1 TABLET TABLET ORAL TWICE A DAY
Status: ACTIVE | COMMUNITY

## 2023-11-06 RX ORDER — PANTOPRAZOLE SODIUM 40 MG/1
1 TABLET TABLET, DELAYED RELEASE ORAL ONCE A DAY
Qty: 90 | Refills: 3 | Status: ACTIVE | COMMUNITY
Start: 2021-10-25

## 2023-11-06 RX ORDER — PANTOPRAZOLE SODIUM 40 MG/1
1 TABLET TABLET, DELAYED RELEASE ORAL ONCE A DAY
OUTPATIENT
Start: 2021-10-25

## 2023-11-06 RX ORDER — FLUTICASONE PROPIONATE 50 UG/1
1 SPRAY IN EACH NOSTRIL SPRAY, METERED NASAL ONCE A DAY
Status: ACTIVE | COMMUNITY

## 2023-11-06 NOTE — HPI-TODAY'S VISIT:
This is a 66-year-old female referred by Dr Allen for a GI consultation of colon cancer screening and GERD and a copy of this note was sent to the referring provider.  She had been on Nexium in the past for GERD and was using a CPAP machine at night for sleep apnea but complaining of chronic cough..  Patient also had chronic constipation.  Patient had an EGD on July 5, 2016 that revealed some gastric erythema and erythema of the lower esophagus biopsies were taken.  Patient had a colonoscopy as well on the same day for first-degree relative with history of polyps and constipation and this revealed internal hemorrhoids otherwise normal exam Dr. Hong recommended repeat exam in 5 years.  Biopsy of the stomach showed reactive gastropathy but no H. pylori and esophageal biopsy was normal.  Patient also had a barium swallow on September 20, 2016 that was normal.  CT scan of the abdomen was done on August 9, 2016 which revealed a normal gallbladder, normal liver, normal pancreas. Pt say she goes about every other day in terms of her bowels.Has chronic cough. This year she saw ENT and pulmonary doctors. ENT did a full work up and negative. Pulmonary will f/u on a lung nodule. Pt has severe allergies and is being treated for this. protonix helps GERD. cough has improved a lot after medrol dosepak. not needing tessalon perles  Today June 1, 2023 the patient returns for a follow-up visit, the patient was last seen by Dr. Parish on October 3, 2022 with gastroesophageal reflux, chronic cough, sleep apnea, chronic idiopathic constipation, family history of colon polyps, the patient was to be scheduled for a colon cancer screening.  The patient has been treated with Nexium for GERD and was using the CPAP machine at night for sleep apnea but did complain of chronic cough.  The patient had chronic constipation.  The patient had an EGD on July 5, 2016 which revealed gastric erythema, erythema in the lower esophagus, biopsies were taken.  The patient was found to have reactive gastropathy H. pylori negative and the esophageal biopsies were normal.  The patient had a colonoscopy on the same day which revealed internal hemorrhoids.  At that time the patient was advised to get a colonoscopy in 5 years.  A barium swallow performed September 20, 2016 was normal.  A CT scan of the abdomen performed on August 9, 2016 revealed a normal gallbladder, normal liver, normal pancreas.  The patient had been seen by ENT and pulmonary medicine for chronic cough and the work-up was negative.  The patient had severe allergies and was being treated and along with it was taking Protonix for GERD.  The patient was suspected to have a multifactorial cough possibly allergies and bronchial spasm, in the past neutral Dosepak helped.  The patient was to be scheduled for an EGD and a colonoscopy.  The patient's EGD performed on October 27, 2022 revealed a 1 cm hiatal hernia mild chemical reactive gastropathy H. pylori negative and negative for intestinal metaplasia, squamocolumnar mucosa with reflux type changes.  On the same day the patient had a colonoscopy which revealed a 5 mm transverse sessile polyp which was hyperplastic.  The patient was found to have small nonbleeding internal hemorrhoids and sigmoid, descending colon and transverse colon diverticulosis with no evidence of diverticulitis or bleeding.  Today the patient returns to the office to get results from the previous colonoscopy and EGD performed by Dr. Parish.  The patient was made aware that on the EGD she was found to have a hiatal hernia, mild chemical reactive gastropathy H. pylori negative and negative for intestinal metaplasia, the esophagus showed reflux type changes with no evidence of Rojas's.  The colonoscopy revealed a 5 mm transverse sessile hyperplastic polyp, small nonbleeding internal hemorrhoids and diverticulosis throughout the sigmoid descending and transverse colon.  The patient states that as long as she follows antireflux measures and diet and takes pantoprazole 40 mg daily she has no acid reflux.  Otherwise she becomes symptomatic.  The patient denies having any dysphagia, odynophagia, there was no nausea or vomiting.  There was no evidence of GI bleeding.  Bowel movements remain type IV on the Day scale, there is no blood in the stool.  The patient was advised to follow antireflux measures and diet, continue to take pantoprazole 40 mg daily.  The patient's next colonoscopy will be due in 2027 in view of the family history of colonic polyps, she will remain on a high-fiber diet.  The patient inquired about her 2016 liver testing where she was found to have either a liver cyst or hemangioma, there has been no follow-up appointment, the patient will be scheduled to have an ultrasound of the right upper quadrant, she will be contacted with reports and recommendations.  As long as doing well the patient will return for follow-up visit in 1 year or as needed.  Today November 6, 2023 the patient returns for a follow-up visit, the patient was last seen on June 1, 2023 with GERD without esophagitis, chronic gastritis without bleeding, family history of colon polyps, colonic diverticulosis without diverticulitis, a hyperplastic transverse colon polyp, sleep apnea, chronic cough, liver lesion, the patient was overweight.  At the time of the last visit the patient returns after having an EGD and a colonoscopy performed by Dr. Calloway, on the EGD she was found to have a hiatal hernia, mild chemical reactive gastropathy H. pylori negative, negative for intestinal metaplasia, the esophagus showed reflux type changes with no evidence of Rojas's. The colonoscopy revealed a 5 mm transverse sessile hyperplastic polyp, small nonbleeding internal hemorrhoids and diverticulosis throughout the sigmoid descending and transverse colon. The patient stated that as long as she followed  antireflux measures and diet and was taking pantoprazole 40 mg daily she had no acid reflux otherwise she became symptomatic. The patient denies having any dysphagia, odynophagia, there was no nausea or vomiting.  The patient denies having any GI bleeding.  The patient's bowel movements remain type IV on the Day scale, there was no blood in the stool. The patient at the time was advised to follow antireflux measures and diet, continue taking pantoprazole 40 mg daily.  The patient was advised to get a colonoscopy in 2027.  The patient did have family history of colon polyps.  The patient was advised to follow a high-fiber diet. The patient inquired about the 2016 liver testing where she was found to have a either a liver cyst or hemangioma, there had been no follow-up since.  The patient was scheduled to have a right upper quadrant ultrasound.  The ultrasound was performed on June 20, 2023 and it revealed a simple cyst in the left lobe of the liver measuring 1.2 x 1.0 x 0.8 cm, the patient had a normal common bile duct measuring 2 mm and no gallstones.  The right kidney was normal. A CT of the abdomen and pelvis performed on July 13, 2023 revealed a normal-looking liver, normal gallbladder, pancreas, spleen, adrenals.  The patient had moderate right-sided hydroureteronephrosis secondary to a 0.5 cm stone in the distal right ureter measuring 440 Hounsfield units, there were also significant infiltrative changes around the right kidney, there were additional nonobstructing stones in the right upper and mid kidney the patient's the patient's stomach, small bowel and colon appeared to be normal, the appendix was not identified, there was no inflammatory change.  The patient did have a posterior fusion of L4 and L5 with disc prostheses and postoperative changes as well as degenerative disc disease of L2-L3 and L3-L4.  Today the patient returns to the office stating that she is doing reasonably well, she had been seen by her OB/GYN physician for some left inguinal discomfort, currently she is having normal bowel movements, denies having any diarrhea, constipation, rectal bleeding or hematochezia.  She claims that as the day goes on she developed some left inguinal discomfort.The patient denied having any upper GI symptoms such as dysphagia, odynophagia, nausea, vomiting, heartburn. I discussed with the patient the 2016 liver ultrasound which revealed a 7 mm hemangioma, subsequently discussed with the patient the ultrasound performed in June 2023 which now revealed a 1.2 x 1.0 x 0.8 cm simple cyst with reverberation artifact in the left lobe of the liver, normal, bile duct and gallbladder, normal liver otherwise and the CT scan performed in July 2023 for a kidney stone that showed a normal liver.  The patient is very concerned about her liver, the patient agreed to get an MRI with and without contrast to clarify the type of lesion she does or not have and an alpha-fetoprotein.  We will contact the patient with reports of recommendations.  The patient will return for a follow-up visit in 6 months or as needed.

## 2023-11-10 LAB
AFP, SERUM: 1
AFP-L3%, SERUM: (no result)

## 2024-01-11 ENCOUNTER — APPOINTMENT (RX ONLY)
Dept: URBAN - METROPOLITAN AREA OTHER 10 | Facility: OTHER | Age: 69
Setting detail: DERMATOLOGY
End: 2024-01-11

## 2024-01-11 DIAGNOSIS — Z79.899 OTHER LONG TERM (CURRENT) DRUG THERAPY: ICD-10-CM | Status: STABLE

## 2024-01-11 DIAGNOSIS — L67.8 OTHER HAIR COLOR AND HAIR SHAFT ABNORMALITIES: ICD-10-CM

## 2024-01-11 DIAGNOSIS — L64.8 OTHER ANDROGENIC ALOPECIA: ICD-10-CM | Status: INADEQUATELY CONTROLLED

## 2024-01-11 PROBLEM — L65.9 NONSCARRING HAIR LOSS, UNSPECIFIED: Status: ACTIVE | Noted: 2024-01-11

## 2024-01-11 PROCEDURE — ? PATIENT SPECIFIC COUNSELING

## 2024-01-11 PROCEDURE — ? INTRALESIONAL KENALOG

## 2024-01-11 PROCEDURE — ? MEDICATION COUNSELING

## 2024-01-11 PROCEDURE — ? COUNSELING

## 2024-01-11 PROCEDURE — ? HIGH RISK MEDICATION MONITORING

## 2024-01-11 PROCEDURE — ? ADDITIONAL NOTES

## 2024-01-11 PROCEDURE — 11900 INJECT SKIN LESIONS </W 7: CPT

## 2024-01-11 PROCEDURE — 99212 OFFICE O/P EST SF 10 MIN: CPT | Mod: 25

## 2024-01-11 ASSESSMENT — LOCATION SIMPLE DESCRIPTION DERM
LOCATION SIMPLE: HAIR
LOCATION SIMPLE: LEFT FOREHEAD
LOCATION SIMPLE: RIGHT FOREHEAD

## 2024-01-11 ASSESSMENT — LOCATION ZONE DERM
LOCATION ZONE: SCALP
LOCATION ZONE: FACE

## 2024-01-11 ASSESSMENT — LOCATION DETAILED DESCRIPTION DERM
LOCATION DETAILED: LEFT FOREHEAD
LOCATION DETAILED: RIGHT FOREHEAD
LOCATION DETAILED: HAIR
LOCATION DETAILED: LEFT INFERIOR LATERAL FOREHEAD
LOCATION DETAILED: RIGHT INFERIOR FOREHEAD

## 2024-01-11 NOTE — PROCEDURE: PATIENT SPECIFIC COUNSELING
Advised to buy otc Rogaine and use daily. Opzelura sample given to patient. Plan to give compound if she does like it.
Detail Level: Zone

## 2024-01-11 NOTE — PROCEDURE: MEDICATION COUNSELING
Bexarotene Pregnancy And Lactation Text: This medication is Pregnancy Category X and should not be given to women who are pregnant or may become pregnant. This medication should not be used if you are breast feeding.
5-Fu Counseling: 5-Fluorouracil Counseling:  I discussed with the patient the risks of 5-fluorouracil including but not limited to erythema, scaling, itching, weeping, crusting, and pain.
Solaraze Counseling:  I discussed with the patient the risks of Solaraze including but not limited to erythema, scaling, itching, weeping, crusting, and pain.
Albendazole Pregnancy And Lactation Text: This medication is Pregnancy Category C and it isn't known if it is safe during pregnancy. It is also excreted in breast milk.
Tremfya Counseling: I discussed with the patient the risks of guselkumab including but not limited to immunosuppression, serious infections, and drug reactions.  The patient understands that monitoring is required including a PPD at baseline and must alert us or the primary physician if symptoms of infection or other concerning signs are noted.
Rinvoq Pregnancy And Lactation Text: Based on animal studies, Rinvoq may cause embryo-fetal harm when administered to pregnant women.  The medication should not be used in pregnancy.  Breastfeeding is not recommended during treatment and for 6 days after the last dose.
Fluconazole Pregnancy And Lactation Text: This medication is Pregnancy Category C and it isn't know if it is safe during pregnancy. It is also excreted in breast milk.
Prednisone Pregnancy And Lactation Text: This medication is Pregnancy Category C and it isn't know if it is safe during pregnancy. This medication is excreted in breast milk.
Nsaids Pregnancy And Lactation Text: These medications are considered safe up to 30 weeks gestation. It is excreted in breast milk.
Cimzia Pregnancy And Lactation Text: This medication crosses the placenta but can be considered safe in certain situations. Cimzia may be excreted in breast milk.
Imiquimod Pregnancy And Lactation Text: This medication is Pregnancy Category C. It is unknown if this medication is excreted in breast milk.
Colchicine Counseling:  Patient counseled regarding adverse effects including but not limited to stomach upset (nausea, vomiting, stomach pain, or diarrhea).  Patient instructed to limit alcohol consumption while taking this medication.  Colchicine may reduce blood counts especially with prolonged use.  The patient understands that monitoring of kidney function and blood counts may be required, especially at baseline. The patient verbalized understanding of the proper use and possible adverse effects of colchicine.  All of the patient's questions and concerns were addressed.
Clindamycin Pregnancy And Lactation Text: This medication can be used in pregnancy if certain situations. Clindamycin is also present in breast milk.
Erivedge Pregnancy And Lactation Text: This medication is Pregnancy Category X and is absolutely contraindicated during pregnancy. It is unknown if it is excreted in breast milk.
Topical Clindamycin Pregnancy And Lactation Text: This medication is Pregnancy Category B and is considered safe during pregnancy. It is unknown if it is excreted in breast milk.
Propranolol Counseling:  I discussed with the patient the risks of propranolol including but not limited to low heart rate, low blood pressure, low blood sugar, restlessness and increased cold sensitivity. They should call the office if they experience any of these side effects.
Cellcept Pregnancy And Lactation Text: This medication is Pregnancy Category D and isn't considered safe during pregnancy. It is unknown if this medication is excreted in breast milk.
Valtrex Pregnancy And Lactation Text: this medication is Pregnancy Category B and is considered safe during pregnancy. This medication is not directly found in breast milk but it's metabolite acyclovir is present.
Simponi Counseling:  I discussed with the patient the risks of golimumab including but not limited to myelosuppression, immunosuppression, autoimmune hepatitis, demyelinating diseases, lymphoma, and serious infections.  The patient understands that monitoring is required including a PPD at baseline and must alert us or the primary physician if symptoms of infection or other concerning signs are noted.
Finasteride Pregnancy And Lactation Text: This medication is absolutely contraindicated during pregnancy. It is unknown if it is excreted in breast milk.
Wartpeel Counseling:  I discussed with the patient the risks of Wartpeel including but not limited to erythema, scaling, itching, weeping, crusting, and pain.
Azelaic Acid Counseling: Patient counseled that medicine may cause skin irritation and to avoid applying near the eyes.  In the event of skin irritation, the patient was advised to reduce the amount of the drug applied or use it less frequently.   The patient verbalized understanding of the proper use and possible adverse effects of azelaic acid.  All of the patient's questions and concerns were addressed.
Picato Counseling:  I discussed with the patient the risks of Picato including but not limited to erythema, scaling, itching, weeping, crusting, and pain.
Ilumya Counseling: I discussed with the patient the risks of tildrakizumab including but not limited to immunosuppression, malignancy, posterior leukoencephalopathy syndrome, and serious infections.  The patient understands that monitoring is required including a PPD at baseline and must alert us or the primary physician if symptoms of infection or other concerning signs are noted.
Quinolones Counseling:  I discussed with the patient the risks of fluoroquinolones including but not limited to GI upset, allergic reaction, drug rash, diarrhea, dizziness, photosensitivity, yeast infections, liver function test abnormalities, tendonitis/tendon rupture.
Rifampin Counseling: I discussed with the patient the risks of rifampin including but not limited to liver damage, kidney damage, red-orange body fluids, nausea/vomiting and severe allergy.
Glycopyrrolate Pregnancy And Lactation Text: This medication is Pregnancy Category B and is considered safe during pregnancy. It is unknown if it is excreted breast milk.
Griseofulvin Counseling:  I discussed with the patient the risks of griseofulvin including but not limited to photosensitivity, cytopenia, liver damage, nausea/vomiting and severe allergy.  The patient understands that this medication is best absorbed when taken with a fatty meal (e.g., ice cream or french fries).
Solaraze Pregnancy And Lactation Text: This medication is Pregnancy Category B and is considered safe. There is some data to suggest avoiding during the third trimester. It is unknown if this medication is excreted in breast milk.
Ivermectin Counseling:  Patient instructed to take medication on an empty stomach with a full glass of water.  Patient informed of potential adverse effects including but not limited to nausea, diarrhea, dizziness, itching, and swelling of the extremities or lymph nodes.  The patient verbalized understanding of the proper use and possible adverse effects of ivermectin.  All of the patient's questions and concerns were addressed.
Cosentyx Counseling:  I discussed with the patient the risks of Cosentyx including but not limited to worsening of Crohn's disease, immunosuppression, allergic reactions and infections.  The patient understands that monitoring is required including a PPD at baseline and must alert us or the primary physician if symptoms of infection or other concerning signs are noted.
Cimetidine Counseling:  I discussed with the patient the risks of Cimetidine including but not limited to gynecomastia, headache, diarrhea, nausea, drowsiness, arrhythmias, pancreatitis, skin rashes, psychosis, bone marrow suppression and kidney toxicity.
5-Fu Pregnancy And Lactation Text: This medication is Pregnancy Category X and contraindicated in pregnancy and in women who may become pregnant. It is unknown if this medication is excreted in breast milk.
Tremfya Pregnancy And Lactation Text: The risk during pregnancy and breastfeeding is uncertain with this medication.
Topical Ketoconazole Counseling: Patient counseled that this medication may cause skin irritation or allergic reactions.  In the event of skin irritation, the patient was advised to reduce the amount of the drug applied or use it less frequently.   The patient verbalized understanding of the proper use and possible adverse effects of ketoconazole.  All of the patient's questions and concerns were addressed.
Olanzapine Counseling- I discussed with the patient the common side effects of olanzapine including but are not limited to: lack of energy, dry mouth, increased appetite, sleepiness, tremor, constipation, dizziness, changes in behavior, or restlessness.  Explained that teenagers are more likely to experience headaches, abdominal pain, pain in the arms or legs, tiredness, and sleepiness.  Serious side effects include but are not limited: increased risk of death in elderly patients who are confused, have memory loss, or dementia-related psychosis; hyperglycemia; increased cholesterol and triglycerides; and weight gain.
Klisyri Counseling:  I discussed with the patient the risks of Klisyri including but not limited to erythema, scaling, itching, weeping, crusting, and pain.
Isotretinoin Counseling: Patient should get monthly blood tests, not donate blood, not drive at night if vision affected, not share medication, and not undergo elective surgery for 6 months after tx completed. Side effects reviewed, pt to contact office should one occur.
Colchicine Pregnancy And Lactation Text: This medication is Pregnancy Category C and isn't considered safe during pregnancy. It is excreted in breast milk.
Sotyktu Counseling:  I discussed the most common side effects of Sotyktu including: common cold, sore throat, sinus infections, cold sores, canker sores, folliculitis, and acne.? I also discussed more serious side effects of Sotyktu including but not limited to: serious allergic reactions; increased risk for infections such as TB; cancers such as lymphomas; rhabdomyolysis and elevated CPK; and elevated triglycerides and liver enzymes.?
Birth Control Pills Counseling: Birth Control Pill Counseling: I discussed with the patient the potential side effects of OCPs including but not limited to increased risk of stroke, heart attack, thrombophlebitis, deep venous thrombosis, hepatic adenomas, breast changes, GI upset, headaches, and depression.  The patient verbalized understanding of the proper use and possible adverse effects of OCPs. All of the patient's questions and concerns were addressed.
Propranolol Pregnancy And Lactation Text: This medication is Pregnancy Category C and it isn't known if it is safe during pregnancy. It is excreted in breast milk.
Libtayo Counseling- I discussed with the patient the risks of Libtayo including but not limited to nausea, vomiting, diarrhea, and bone or muscle pain.  The patient verbalized understanding of the proper use and possible adverse effects of Libtayo.  All of the patient's questions and concerns were addressed.
Doxycycline Counseling:  Patient counseled regarding possible photosensitivity and increased risk for sunburn.  Patient instructed to avoid sunlight, if possible.  When exposed to sunlight, patients should wear protective clothing, sunglasses, and sunscreen.  The patient was instructed to call the office immediately if the following severe adverse effects occur:  hearing changes, easy bruising/bleeding, severe headache, or vision changes.  The patient verbalized understanding of the proper use and possible adverse effects of doxycycline.  All of the patient's questions and concerns were addressed.
Azithromycin Counseling:  I discussed with the patient the risks of azithromycin including but not limited to GI upset, allergic reaction, drug rash, diarrhea, and yeast infections.
Use Enhanced Medication Counseling?: No
Cyclophosphamide Counseling:  I discussed with the patient the risks of cyclophosphamide including but not limited to hair loss, hormonal abnormalities, decreased fertility, abdominal pain, diarrhea, nausea and vomiting, bone marrow suppression and infection. The patient understands that monitoring is required while taking this medication.
Cibinqo Counseling: I discussed with the patient the risks of Cibinqo therapy including but not limited to common cold, nausea, headache, cold sores, increased blood CPK levels, dizziness, UTIs, fatigue, acne, and vomitting. Live vaccines should be avoided.  This medication has been linked to serious infections; higher rate of mortality; malignancy and lymphoproliferative disorders; major adverse cardiovascular events; thrombosis; thrombocytopenia and lymphopenia; lipid elevations; and retinal detachment.
Azelaic Acid Pregnancy And Lactation Text: This medication is considered safe during pregnancy and breast feeding.
Hydroxychloroquine Counseling:  I discussed with the patient that a baseline ophthalmologic exam is needed at the start of therapy and every year thereafter while on therapy. A CBC may also be warranted for monitoring.  The side effects of this medication were discussed with the patient, including but not limited to agranulocytosis, aplastic anemia, seizures, rashes, retinopathy, and liver toxicity. Patient instructed to call the office should any adverse effect occur.  The patient verbalized understanding of the proper use and possible adverse effects of Plaquenil.  All the patient's questions and concerns were addressed.
Olanzapine Pregnancy And Lactation Text: This medication is pregnancy category C.   There are no adequate and well controlled trials with olanzapine in pregnant females.  Olanzapine should be used during pregnancy only if the potential benefit justifies the potential risk to the fetus.   In a study in lactating healthy women, olanzapine was excreted in breast milk.  It is recommended that women taking olanzapine should not breast feed.
Griseofulvin Pregnancy And Lactation Text: This medication is Pregnancy Category X and is known to cause serious birth defects. It is unknown if this medication is excreted in breast milk but breast feeding should be avoided.
Cimetidine Pregnancy And Lactation Text: This medication is Pregnancy Category B and is considered safe during pregnancy. It is also excreted in breast milk and breast feeding isn't recommended.
Drysol Counseling:  I discussed with the patient the risks of drysol/aluminum chloride including but not limited to skin rash, itching, irritation, burning.
Rifampin Pregnancy And Lactation Text: This medication is Pregnancy Category C and it isn't know if it is safe during pregnancy. It is also excreted in breast milk and should not be used if you are breast feeding.
Isotretinoin Pregnancy And Lactation Text: This medication is Pregnancy Category X and is considered extremely dangerous during pregnancy. It is unknown if it is excreted in breast milk.
Sotyktu Pregnancy And Lactation Text: There is insufficient data to evaluate whether or not Sotyktu is safe to use during pregnancy.? ?It is not known if Sotyktu passes into breast milk and whether or not it is safe to use when breastfeeding.??
Soolantra Counseling: I discussed with the patients the risks of topial Soolantra. This is a medicine which decreases the number of mites and inflammation in the skin. You experience burning, stinging, eye irritation or allergic reactions.  Please call our office if you develop any problems from using this medication.
Xolair Counseling:  Patient informed of potential adverse effects including but not limited to fever, muscle aches, rash and allergic reactions.  The patient verbalized understanding of the proper use and possible adverse effects of Xolair.  All of the patient's questions and concerns were addressed.
SSKI Counseling:  I discussed with the patient the risks of SSKI including but not limited to thyroid abnormalities, metallic taste, GI upset, fever, headache, acne, arthralgias, paraesthesias, lymphadenopathy, easy bleeding, arrhythmias, and allergic reaction.
Cosentyx Pregnancy And Lactation Text: This medication is Pregnancy Category B and is considered safe during pregnancy. It is unknown if this medication is excreted in breast milk.
Doxycycline Pregnancy And Lactation Text: This medication is Pregnancy Category D and not consider safe during pregnancy. It is also excreted in breast milk but is considered safe for shorter treatment courses.
Dapsone Counseling: I discussed with the patient the risks of dapsone including but not limited to hemolytic anemia, agranulocytosis, rashes, methemoglobinemia, kidney failure, peripheral neuropathy, headaches, GI upset, and liver toxicity.  Patients who start dapsone require monitoring including baseline LFTs and weekly CBCs for the first month, then every month thereafter.  The patient verbalized understanding of the proper use and possible adverse effects of dapsone.  All of the patient's questions and concerns were addressed.
Klisyri Pregnancy And Lactation Text: It is unknown if this medication can harm a developing fetus or if it is excreted in breast milk.
Opioid Counseling: I discussed with the patient the potential side effects of opioids including but not limited to addiction, altered mental status, and depression. I stressed avoiding alcohol, benzodiazepines, muscle relaxants and sleep aids unless specifically okayed by a physician. The patient verbalized understanding of the proper use and possible adverse effects of opioids. All of the patient's questions and concerns were addressed. They were instructed to flush the remaining pills down the toilet if they did not need them for pain.
Skyrizi Counseling: I discussed with the patient the risks of risankizumab-rzaa including but not limited to immunosuppression, and serious infections.  The patient understands that monitoring is required including a PPD at baseline and must alert us or the primary physician if symptoms of infection or other concerning signs are noted.
Cibinqo Pregnancy And Lactation Text: It is unknown if this medication will adversely affect pregnancy or breast feeding.  You should not take this medication if you are currently pregnant or planning a pregnancy or while breastfeeding.
Birth Control Pills Pregnancy And Lactation Text: This medication should be avoided if pregnant and for the first 30 days post-partum.
Winlevi Counseling:  I discussed with the patient the risks of topical clascoterone including but not limited to erythema, scaling, itching, and stinging. Patient voiced their understanding.
Cyclophosphamide Pregnancy And Lactation Text: This medication is Pregnancy Category D and it isn't considered safe during pregnancy. This medication is excreted in breast milk.
Libtayo Pregnancy And Lactation Text: This medication is contraindicated in pregnancy and when breast feeding.
Hydroxychloroquine Pregnancy And Lactation Text: This medication has been shown to cause fetal harm but it isn't assigned a Pregnancy Risk Category. There are small amounts excreted in breast milk.
Protopic Counseling: Patient may experience a mild burning sensation during topical application. Protopic is not approved in children less than 2 years of age. There have been case reports of hematologic and skin malignancies in patients using topical calcineurin inhibitors although causality is questionable.
Azithromycin Pregnancy And Lactation Text: This medication is considered safe during pregnancy and is also secreted in breast milk.
Infliximab Counseling:  I discussed with the patient the risks of infliximab including but not limited to myelosuppression, immunosuppression, autoimmune hepatitis, demyelinating diseases, lymphoma, and serious infections.  The patient understands that monitoring is required including a PPD at baseline and must alert us or the primary physician if symptoms of infection or other concerning signs are noted.
Sarecycline Counseling: Patient advised regarding possible photosensitivity and discoloration of the teeth, skin, lips, tongue and gums.  Patient instructed to avoid sunlight, if possible.  When exposed to sunlight, patients should wear protective clothing, sunglasses, and sunscreen.  The patient was instructed to call the office immediately if the following severe adverse effects occur:  hearing changes, easy bruising/bleeding, severe headache, or vision changes.  The patient verbalized understanding of the proper use and possible adverse effects of sarecycline.  All of the patient's questions and concerns were addressed.
Benzoyl Peroxide Counseling: Patient counseled that medicine may cause skin irritation and bleach clothing.  In the event of skin irritation, the patient was advised to reduce the amount of the drug applied or use it less frequently.   The patient verbalized understanding of the proper use and possible adverse effects of benzoyl peroxide.  All of the patient's questions and concerns were addressed.
Soolantra Pregnancy And Lactation Text: This medication is Pregnancy Category C. This medication is considered safe during breast feeding.
High Dose Vitamin A Counseling: Side effects reviewed, pt to contact office should one occur.
Xolair Pregnancy And Lactation Text: This medication is Pregnancy Category B and is considered safe during pregnancy. This medication is excreted in breast milk.
Xeljanz Counseling: I discussed with the patient the risks of Xeljanz therapy including increased risk of infection, liver issues, headache, diarrhea, or cold symptoms. Live vaccines should be avoided. They were instructed to call if they have any problems.
Topical Metronidazole Counseling: Metronidazole is a topical antibiotic medication. You may experience burning, stinging, redness, or allergic reactions.  Please call our office if you develop any problems from using this medication.
Doxepin Counseling:  Patient advised that the medication is sedating and not to drive a car after taking this medication. Patient informed of potential adverse effects including but not limited to dry mouth, urinary retention, and blurry vision.  The patient verbalized understanding of the proper use and possible adverse effects of doxepin.  All of the patient's questions and concerns were addressed.
Itraconazole Counseling:  I discussed with the patient the risks of itraconazole including but not limited to liver damage, nausea/vomiting, neuropathy, and severe allergy.  The patient understands that this medication is best absorbed when taken with acidic beverages such as non-diet cola or ginger ale.  The patient understands that monitoring is required including baseline LFTs and repeat LFTs at intervals.  The patient understands that they are to contact us or the primary physician if concerning signs are noted.
Dupixent Counseling: I discussed with the patient the risks of dupilumab including but not limited to eye infection and irritation, cold sores, injection site reactions, worsening of asthma, allergic reactions and increased risk of parasitic infection.  Live vaccines should be avoided while taking dupilumab. Dupilumab will also interact with certain medications such as warfarin and cyclosporine. The patient understands that monitoring is required and they must alert us or the primary physician if symptoms of infection or other concerning signs are noted.
Oral Minoxidil Counseling- I discussed with the patient the risks of oral minoxidil including but not limited to shortness of breath, swelling of the feet or ankles, dizziness, lightheadedness, unwanted hair growth and allergic reaction.  The patient verbalized understanding of the proper use and possible adverse effects of oral minoxidil.  All of the patient's questions and concerns were addressed.
Detail Level: Simple
Erythromycin Counseling:  I discussed with the patient the risks of erythromycin including but not limited to GI upset, allergic reaction, drug rash, diarrhea, increase in liver enzymes, and yeast infections.
Opioid Pregnancy And Lactation Text: These medications can lead to premature delivery and should be avoided during pregnancy. These medications are also present in breast milk in small amounts.
Minoxidil Counseling: Minoxidil is a topical medication which can increase blood flow where it is applied. It is uncertain how this medication increases hair growth. Side effects are uncommon and include stinging and allergic reactions.
Dapsone Pregnancy And Lactation Text: This medication is Pregnancy Category C and is not considered safe during pregnancy or breast feeding.
Winlevi Pregnancy And Lactation Text: This medication is considered safe during pregnancy and breastfeeding.
Odomzo Counseling- I discussed with the patient the risks of Odomzo including but not limited to nausea, vomiting, diarrhea, constipation, weight loss, changes in the sense of taste, decreased appetite, muscle spasms, and hair loss.  The patient verbalized understanding of the proper use and possible adverse effects of Odomzo.  All of the patient's questions and concerns were addressed.
Sski Pregnancy And Lactation Text: This medication is Pregnancy Category D and isn't considered safe during pregnancy. It is excreted in breast milk.
Spironolactone Counseling: Patient advised regarding risks of diarrhea, abdominal pain, hyperkalemia, birth defects (for female patients), liver toxicity and renal toxicity. The patient may need blood work to monitor liver and kidney function and potassium levels while on therapy. The patient verbalized understanding of the proper use and possible adverse effects of spironolactone.  All of the patient's questions and concerns were addressed.
Cyclosporine Counseling:  I discussed with the patient the risks of cyclosporine including but not limited to hypertension, gingival hyperplasia,myelosuppression, immunosuppression, liver damage, kidney damage, neurotoxicity, lymphoma, and serious infections. The patient understands that monitoring is required including baseline blood pressure, CBC, CMP, lipid panel and uric acid, and then 1-2 times monthly CMP and blood pressure.
Benzoyl Peroxide Pregnancy And Lactation Text: This medication is Pregnancy Category C. It is unknown if benzoyl peroxide is excreted in breast milk.
Adbry Counseling: I discussed with the patient the risks of tralokinumab including but not limited to eye infection and irritation, cold sores, injection site reactions, worsening of asthma, allergic reactions and increased risk of parasitic infection.  Live vaccines should be avoided while taking tralokinumab. The patient understands that monitoring is required and they must alert us or the primary physician if symptoms of infection or other concerning signs are noted.
Protopic Pregnancy And Lactation Text: This medication is Pregnancy Category C. It is unknown if this medication is excreted in breast milk when applied topically.
Low Dose Naltrexone Counseling- I discussed with the patient the potential risks and side effects of low dose naltrexone including but not limited to: more vivid dreams, headaches, nausea, vomiting, abdominal pain, fatigue, dizziness, and anxiety.
Litfulo Counseling: I discussed with the patient the risks of Litfulo therapy including but not limited to upper respiratory tract infections, shingles, cold sores, and nausea. Live vaccines should be avoided.  This medication has been linked to serious infections; higher rate of mortality; malignancy and lymphoproliferative disorders; major adverse cardiovascular events; thrombosis; gastrointestinal perforations; neutropenia; lymphopenia; anemia; liver enzyme elevations; and lipid elevations.
Bactrim Counseling:  I discussed with the patient the risks of sulfa antibiotics including but not limited to GI upset, allergic reaction, drug rash, diarrhea, dizziness, photosensitivity, and yeast infections.  Rarely, more serious reactions can occur including but not limited to aplastic anemia, agranulocytosis, methemoglobinemia, blood dyscrasias, liver or kidney failure, lung infiltrates or desquamative/blistering drug rashes.
Oral Minoxidil Pregnancy And Lactation Text: This medication should only be used when clearly needed if you are pregnant, attempting to become pregnant or breast feeding.
Eucrisa Counseling: Patient may experience a mild burning sensation during topical application. Eucrisa is not approved in children less than 3 months of age.
Doxepin Pregnancy And Lactation Text: This medication is Pregnancy Category C and it isn't known if it is safe during pregnancy. It is also excreted in breast milk and breast feeding isn't recommended.
Sarecycline Pregnancy And Lactation Text: This medication is Pregnancy Category D and not consider safe during pregnancy. It is also excreted in breast milk.
Gabapentin Counseling: I discussed with the patient the risks of gabapentin including but not limited to dizziness, somnolence, fatigue and ataxia.
Topical Retinoid counseling:  Patient advised to apply a pea-sized amount only at bedtime and wait 30 minutes after washing their face before applying.  If too drying, patient may add a non-comedogenic moisturizer. The patient verbalized understanding of the proper use and possible adverse effects of retinoids.  All of the patient's questions and concerns were addressed.
Xeledaz Pregnancy And Lactation Text: This medication is Pregnancy Category D and is not considered safe during pregnancy.  The risk during breast feeding is also uncertain.
Minoxidil Pregnancy And Lactation Text: This medication has not been assigned a Pregnancy Risk Category but animal studies failed to show danger with the topical medication. It is unknown if the medication is excreted in breast milk.
Dupixent Pregnancy And Lactation Text: This medication likely crosses the placenta but the risk for the fetus is uncertain. This medication is excreted in breast milk.
Topical Metronidazole Pregnancy And Lactation Text: This medication is Pregnancy Category B and considered safe during pregnancy.  It is also considered safe to use while breastfeeding.
Thalidomide Counseling: I discussed with the patient the risks of thalidomide including but not limited to birth defects, anxiety, weakness, chest pain, dizziness, cough and severe allergy.
Elidel Counseling: Patient may experience a mild burning sensation during topical application. Elidel is not approved in children less than 2 years of age. There have been case reports of hematologic and skin malignancies in patients using topical calcineurin inhibitors although causality is questionable.
High Dose Vitamin A Pregnancy And Lactation Text: High dose vitamin A therapy is contraindicated during pregnancy and breast feeding.
Erythromycin Pregnancy And Lactation Text: This medication is Pregnancy Category B and is considered safe during pregnancy. It is also excreted in breast milk.
VTAMA Counseling: I discussed with the patient that VTAMA is not for use in the eyes, mouth or mouth. They should call the office if they develop any signs of allergic reactions to VTAMA. The patient verbalized understanding of the proper use and possible adverse effects of VTAMA.  All of the patient's questions and concerns were addressed.
Litfulo Pregnancy And Lactation Text: Based on animal studies, Lifulo may cause embryo-fetal harm when administered to pregnant women.  The medication should not be used in pregnancy.  Breastfeeding is not recommended during treatment.
Spironolactone Pregnancy And Lactation Text: This medication can cause feminization of the male fetus and should be avoided during pregnancy. The active metabolite is also found in breast milk.
Stelara Counseling:  I discussed with the patient the risks of ustekinumab including but not limited to immunosuppression, malignancy, posterior leukoencephalopathy syndrome, and serious infections.  The patient understands that monitoring is required including a PPD at baseline and must alert us or the primary physician if symptoms of infection or other concerning signs are noted.
Low Dose Naltrexone Pregnancy And Lactation Text: Naltrexone is pregnancy category C.  There have been no adequate and well-controlled studies in pregnant women.  It should be used in pregnancy only if the potential benefit justifies the potential risk to the fetus.   Limited data indicates that naltrexone is minimally excreted into breastmilk.
Carac Counseling:  I discussed with the patient the risks of Carac including but not limited to erythema, scaling, itching, weeping, crusting, and pain.
Qbrexza Counseling:  I discussed with the patient the risks of Qbrexza including but not limited to headache, mydriasis, blurred vision, dry eyes, nasal dryness, dry mouth, dry throat, dry skin, urinary hesitation, and constipation.  Local skin reactions including erythema, burning, stinging, and itching can also occur.
Adbry Pregnancy And Lactation Text: It is unknown if this medication will adversely affect pregnancy or breast feeding.
Arava Counseling:  Patient counseled regarding adverse effects of Arava including but not limited to nausea, vomiting, abnormalities in liver function tests. Patients may develop mouth sores, rash, diarrhea, and abnormalities in blood counts. The patient understands that monitoring is required including LFTs and blood counts.  There is a rare possibility of scarring of the liver and lung problems that can occur when taking methotrexate. Persistent nausea, loss of appetite, pale stools, dark urine, cough, and shortness of breath should be reported immediately. Patient advised to discontinue Arava treatment and consult with a physician prior to attempting conception. The patient will have to undergo a treatment to eliminate Arava from the body prior to conception.
Bactrim Pregnancy And Lactation Text: This medication is Pregnancy Category D and is known to cause fetal risk.  It is also excreted in breast milk.
Rituxan Counseling:  I discussed with the patient the risks of Rituxan infusions. Side effects can include infusion reactions, severe drug rashes including mucocutaneous reactions, reactivation of latent hepatitis and other infections and rarely progressive multifocal leukoencephalopathy.  All of the patient's questions and concerns were addressed.
Otezla Counseling: The side effects of Otezla were discussed with the patient, including but not limited to worsening or new depression, weight loss, diarrhea, nausea, upper respiratory tract infection, and headache. Patient instructed to call the office should any adverse effect occur.  The patient verbalized understanding of the proper use and possible adverse effects of Otezla.  All the patient's questions and concerns were addressed.
Ketoconazole Counseling:   Patient counseled regarding improving absorption with orange juice.  Adverse effects include but are not limited to breast enlargement, headache, diarrhea, nausea, upset stomach, liver function test abnormalities, taste disturbance, and stomach pain.  There is a rare possibility of liver failure that can occur when taking ketoconazole. The patient understands that monitoring of LFTs may be required, especially at baseline. The patient verbalized understanding of the proper use and possible adverse effects of ketoconazole.  All of the patient's questions and concerns were addressed.
Hydroxyzine Counseling: Patient advised that the medication is sedating and not to drive a car after taking this medication.  Patient informed of potential adverse effects including but not limited to dry mouth, urinary retention, and blurry vision.  The patient verbalized understanding of the proper use and possible adverse effects of hydroxyzine.  All of the patient's questions and concerns were addressed.
Tetracycline Counseling: Patient counseled regarding possible photosensitivity and increased risk for sunburn.  Patient instructed to avoid sunlight, if possible.  When exposed to sunlight, patients should wear protective clothing, sunglasses, and sunscreen.  The patient was instructed to call the office immediately if the following severe adverse effects occur:  hearing changes, easy bruising/bleeding, severe headache, or vision changes.  The patient verbalized understanding of the proper use and possible adverse effects of tetracycline.  All of the patient's questions and concerns were addressed. Patient understands to avoid pregnancy while on therapy due to potential birth defects.
Topical Steroids Counseling: I discussed with the patient that prolonged use of topical steroids can result in the increased appearance of superficial blood vessels (telangiectasias), lightening (hypopigmentation) and thinning of the skin (atrophy).  Patient understands to avoid using high potency steroids in skin folds, the groin or the face.  The patient verbalized understanding of the proper use and possible adverse effects of topical steroids.  All of the patient's questions and concerns were addressed.
Enbrel Counseling:  I discussed with the patient the risks of etanercept including but not limited to myelosuppression, immunosuppression, autoimmune hepatitis, demyelinating diseases, lymphoma, and infections.  The patient understands that monitoring is required including a PPD at baseline and must alert us or the primary physician if symptoms of infection or other concerning signs are noted.
Mirvaso Counseling: Mirvaso is a topical medication which can decrease superficial blood flow where applied. Side effects are uncommon and include stinging, redness and allergic reactions.
Vtama Pregnancy And Lactation Text: It is unknown if this medication can cause problems during pregnancy and breastfeeding.
Qbrexza Pregnancy And Lactation Text: There is no available data on Qbrexza use in pregnant women.  There is no available data on Qbrexza use in lactation.
Acitretin Counseling:  I discussed with the patient the risks of acitretin including but not limited to hair loss, dry lips/skin/eyes, liver damage, hyperlipidemia, depression/suicidal ideation, photosensitivity.  Serious rare side effects can include but are not limited to pancreatitis, pseudotumor cerebri, bony changes, clot formation/stroke/heart attack.  Patient understands that alcohol is contraindicated since it can result in liver toxicity and significantly prolong the elimination of the drug by many years.
Olumiant Counseling: I discussed with the patient the risks of Olumiant therapy including but not limited to upper respiratory tract infections, shingles, cold sores, and nausea. Live vaccines should be avoided.  This medication has been linked to serious infections; higher rate of mortality; malignancy and lymphoproliferative disorders; major adverse cardiovascular events; thrombosis; gastrointestinal perforations; neutropenia; lymphopenia; anemia; liver enzyme elevations; and lipid elevations.
Methotrexate Counseling:  Patient counseled regarding adverse effects of methotrexate including but not limited to nausea, vomiting, abnormalities in liver function tests. Patients may develop mouth sores, rash, diarrhea, and abnormalities in blood counts. The patient understands that monitoring is required including LFT's and blood counts.  There is a rare possibility of scarring of the liver and lung problems that can occur when taking methotrexate. Persistent nausea, loss of appetite, pale stools, dark urine, cough, and shortness of breath should be reported immediately. Patient advised to discontinue methotrexate treatment at least three months before attempting to become pregnant.  I discussed the need for folate supplements while taking methotrexate.  These supplements can decrease side effects during methotrexate treatment. The patient verbalized understanding of the proper use and possible adverse effects of methotrexate.  All of the patient's questions and concerns were addressed.
Metronidazole Counseling:  I discussed with the patient the risks of metronidazole including but not limited to seizures, nausea/vomiting, a metallic taste in the mouth, nausea/vomiting and severe allergy.
Niacinamide Counseling: I recommended taking niacin or niacinamide, also know as vitamin B3, twice daily. Recent evidence suggests that taking vitamin B3 (500 mg twice daily) can reduce the risk of actinic keratoses and non-melanoma skin cancers. Side effects of vitamin B3 include flushing and headache.
Erivedge Counseling- I discussed with the patient the risks of Erivedge including but not limited to nausea, vomiting, diarrhea, constipation, weight loss, changes in the sense of taste, decreased appetite, muscle spasms, and hair loss.  The patient verbalized understanding of the proper use and possible adverse effects of Erivedge.  All of the patient's questions and concerns were addressed.
Bimzelx Counseling:  I discussed with the patient the risks of Bimzelx including but not limited to depression, immunosuppression, allergic reactions and infections.  The patient understands that monitoring is required including a PPD at baseline and must alert us or the primary physician if symptoms of infection or other concerning signs are noted.
Tazorac Counseling:  Patient advised that medication is irritating and drying.  Patient may need to apply sparingly and wash off after an hour before eventually leaving it on overnight.  The patient verbalized understanding of the proper use and possible adverse effects of tazorac.  All of the patient's questions and concerns were addressed.
Cephalexin Counseling: I counseled the patient regarding use of cephalexin as an antibiotic for prophylactic and/or therapeutic purposes. Cephalexin (commonly prescribed under brand name Keflex) is a cephalosporin antibiotic which is active against numerous classes of bacteria, including most skin bacteria. Side effects may include nausea, diarrhea, gastrointestinal upset, rash, hives, yeast infections, and in rare cases, hepatitis, kidney disease, seizures, fever, confusion, neurologic symptoms, and others. Patients with severe allergies to penicillin medications are cautioned that there is about a 10% incidence of cross-reactivity with cephalosporins. When possible, patients with penicillin allergies should use alternatives to cephalosporins for antibiotic therapy.
Rituxan Pregnancy And Lactation Text: This medication is Pregnancy Category C and it isn't know if it is safe during pregnancy. It is unknown if this medication is excreted in breast milk but similar antibodies are known to be excreted.
Ketoconazole Pregnancy And Lactation Text: This medication is Pregnancy Category C and it isn't know if it is safe during pregnancy. It is also excreted in breast milk and breast feeding isn't recommended.
Hydroquinone Counseling:  Patient advised that medication may result in skin irritation, lightening (hypopigmentation), dryness, and burning.  In the event of skin irritation, the patient was advised to reduce the amount of the drug applied or use it less frequently.  Rarely, spots that are treated with hydroquinone can become darker (pseudoochronosis).  Should this occur, patient instructed to stop medication and call the office. The patient verbalized understanding of the proper use and possible adverse effects of hydroquinone.  All of the patient's questions and concerns were addressed.
Azathioprine Counseling:  I discussed with the patient the risks of azathioprine including but not limited to myelosuppression, immunosuppression, hepatotoxicity, lymphoma, and infections.  The patient understands that monitoring is required including baseline LFTs, Creatinine, possible TPMP genotyping and weekly CBCs for the first month and then every 2 weeks thereafter.  The patient verbalized understanding of the proper use and possible adverse effects of azathioprine.  All of the patient's questions and concerns were addressed.
Otezla Pregnancy And Lactation Text: This medication is Pregnancy Category C and it isn't known if it is safe during pregnancy. It is unknown if it is excreted in breast milk.
Dutasteride Male Counseling: Dustasteride Counseling:  I discussed with the patient the risks of use of dutasteride including but not limited to decreased libido, decreased ejaculate volume, and gynecomastia. Women who can become pregnant should not handle medication.  All of the patient's questions and concerns were addressed.
Topical Steroids Applications Pregnancy And Lactation Text: Most topical steroids are considered safe to use during pregnancy and lactation.  Any topical steroid applied to the breast or nipple should be washed off before breastfeeding.
Hydroxyzine Pregnancy And Lactation Text: This medication is not safe during pregnancy and should not be taken. It is also excreted in breast milk and breast feeding isn't recommended.
Mirvaso Pregnancy And Lactation Text: This medication has not been assigned a Pregnancy Risk Category. It is unknown if the medication is excreted in breast milk.
Tranexamic Acid Counseling:  Patient advised of the small risk of bleeding problems with tranexamic acid. They were also instructed to call if they developed any nausea, vomiting or diarrhea. All of the patient's questions and concerns were addressed.
Zoryve Counseling:  I discussed with the patient that Zoryve is not for use in the eyes, mouth or vagina. The most commonly reported side effects include diarrhea, headache, insomnia, application site pain, upper respiratory tract infections, and urinary tract infections.  All of the patient's questions and concerns were addressed.
Methotrexate Pregnancy And Lactation Text: This medication is Pregnancy Category X and is known to cause fetal harm. This medication is excreted in breast milk.
Taltz Counseling: I discussed with the patient the risks of ixekizumab including but not limited to immunosuppression, serious infections, worsening of inflammatory bowel disease and drug reactions.  The patient understands that monitoring is required including a PPD at baseline and must alert us or the primary physician if symptoms of infection or other concerning signs are noted.
Metronidazole Pregnancy And Lactation Text: This medication is Pregnancy Category B and considered safe during pregnancy.  It is also excreted in breast milk.
Bimzelx Pregnancy And Lactation Text: This medication crosses the placenta and the safety is uncertain during pregnancy. It is unknown if this medication is present in breast milk.
Niacinamide Pregnancy And Lactation Text: These medications are considered safe during pregnancy.
Rhofade Counseling: Rhofade is a topical medication which can decrease superficial blood flow where applied. Side effects are uncommon and include stinging, redness and allergic reactions.
Acitretin Pregnancy And Lactation Text: This medication is Pregnancy Category X and should not be given to women who are pregnant or may become pregnant in the future. This medication is excreted in breast milk.
Clofazimine Counseling:  I discussed with the patient the risks of clofazimine including but not limited to skin and eye pigmentation, liver damage, nausea/vomiting, gastrointestinal bleeding and allergy.
Olumiant Pregnancy And Lactation Text: Based on animal studies, Olumiant may cause embryo-fetal harm when administered to pregnant women.  The medication should not be used in pregnancy.  Breastfeeding is not recommended during treatment.
Tazorac Pregnancy And Lactation Text: This medication is not safe during pregnancy. It is unknown if this medication is excreted in breast milk.
Cephalexin Pregnancy And Lactation Text: This medication is Pregnancy Category B and considered safe during pregnancy.  It is also excreted in breast milk but can be used safely for shorter doses.
Calcipotriene Counseling:  I discussed with the patient the risks of calcipotriene including but not limited to erythema, scaling, itching, and irritation.
Dutasteride Pregnancy And Lactation Text: This medication is absolutely contraindicated in women, especially during pregnancy and breast feeding. Feminization of male fetuses is possible if taking while pregnant.
Siliq Counseling:  I discussed with the patient the risks of Siliq including but not limited to new or worsening depression, suicidal thoughts and behavior, immunosuppression, malignancy, posterior leukoencephalopathy syndrome, and serious infections.  The patient understands that monitoring is required including a PPD at baseline and must alert us or the primary physician if symptoms of infection or other concerning signs are noted. There is also a special program designed to monitor depression which is required with Siliq.
Oxybutynin Counseling:  I discussed with the patient the risks of oxybutynin including but not limited to skin rash, drowsiness, dry mouth, difficulty urinating, and blurred vision.
Calcipotriene Pregnancy And Lactation Text: The use of this medication during pregnancy or lactation is not recommended as there is insufficient data.
Terbinafine Counseling: Patient counseling regarding adverse effects of terbinafine including but not limited to headache, diarrhea, rash, upset stomach, liver function test abnormalities, itching, taste/smell disturbance, nausea, abdominal pain, and flatulence.  There is a rare possibility of liver failure that can occur when taking terbinafine.  The patient understands that a baseline LFT and kidney function test may be required. The patient verbalized understanding of the proper use and possible adverse effects of terbinafine.  All of the patient's questions and concerns were addressed.
Topical Sulfur Applications Counseling: Topical Sulfur Counseling: Patient counseled that this medication may cause skin irritation or allergic reactions.  In the event of skin irritation, the patient was advised to reduce the amount of the drug applied or use it less frequently.   The patient verbalized understanding of the proper use and possible adverse effects of topical sulfur application.  All of the patient's questions and concerns were addressed.
Aklief counseling:  Patient advised to apply a pea-sized amount only at bedtime and wait 30 minutes after washing their face before applying.  If too drying, patient may add a non-comedogenic moisturizer.  The most commonly reported side effects including irritation, redness, scaling, dryness, stinging, burning, itching, and increased risk of sunburn.  The patient verbalized understanding of the proper use and possible adverse effects of retinoids.  All of the patient's questions and concerns were addressed.
Humira Counseling:  I discussed with the patient the risks of adalimumab including but not limited to myelosuppression, immunosuppression, autoimmune hepatitis, demyelinating diseases, lymphoma, and serious infections.  The patient understands that monitoring is required including a PPD at baseline and must alert us or the primary physician if symptoms of infection or other concerning signs are noted.
Opzelura Counseling:  I discussed with the patient the risks of Opzelura including but not limited to nasopharngitis, bronchitis, ear infection, eosinophila, hives, diarrhea, folliculitis, tonsillitis, and rhinorrhea.  Taken orally, this medication has been linked to serious infections; higher rate of mortality; malignancy and lymphoproliferative disorders; major adverse cardiovascular events; thrombosis; thrombocytopenia, anemia, and neutropenia; and lipid elevations.
Tranexamic Acid Pregnancy And Lactation Text: It is unknown if this medication is safe during pregnancy or breast feeding.
Fluconazole Counseling:  Patient counseled regarding adverse effects of fluconazole including but not limited to headache, diarrhea, nausea, upset stomach, liver function test abnormalities, taste disturbance, and stomach pain.  There is a rare possibility of liver failure that can occur when taking fluconazole.  The patient understands that monitoring of LFTs and kidney function test may be required, especially at baseline. The patient verbalized understanding of the proper use and possible adverse effects of fluconazole.  All of the patient's questions and concerns were addressed.
Rinvoq Counseling: I discussed with the patient the risks of Rinvoq therapy including but not limited to upper respiratory tract infections, shingles, cold sores, bronchitis, nausea, cough, fever, acne, and headache. Live vaccines should be avoided.  This medication has been linked to serious infections; higher rate of mortality; malignancy and lymphoproliferative disorders; major adverse cardiovascular events; thrombosis; thrombocytopenia, anemia, and neutropenia; lipid elevations; liver enzyme elevations; and gastrointestinal perforations.
Prednisone Counseling:  I discussed with the patient the risks of prolonged use of prednisone including but not limited to weight gain, insomnia, osteoporosis, mood changes, diabetes, susceptibility to infection, glaucoma and high blood pressure.  In cases where prednisone use is prolonged, patients should be monitored with blood pressure checks, serum glucose levels and an eye exam.  Additionally, the patient may need to be placed on GI prophylaxis, PCP prophylaxis, and calcium and vitamin D supplementation and/or a bisphosphonate.  The patient verbalized understanding of the proper use and the possible adverse effects of prednisone.  All of the patient's questions and concerns were addressed.
Albendazole Counseling:  I discussed with the patient the risks of albendazole including but not limited to cytopenia, kidney damage, nausea/vomiting and severe allergy.  The patient understands that this medication is being used in an off-label manner.
Nsaids Counseling: NSAID Counseling: I discussed with the patient that NSAIDs should be taken with food. Prolonged use of NSAIDs can result in the development of stomach ulcers.  Patient advised to stop taking NSAIDs if abdominal pain occurs.  The patient verbalized understanding of the proper use and possible adverse effects of NSAIDs.  All of the patient's questions and concerns were addressed.
Cimzia Counseling:  I discussed with the patient the risks of Cimzia including but not limited to immunosuppression, allergic reactions and infections.  The patient understands that monitoring is required including a PPD at baseline and must alert us or the primary physician if symptoms of infection or other concerning signs are noted.
Cantharidin Counseling:  I discussed with the patient the risks of Cantharidin including but not limited to pain, redness, burning, itching, and blistering.
Minocycline Counseling: Patient advised regarding possible photosensitivity and discoloration of the teeth, skin, lips, tongue and gums.  Patient instructed to avoid sunlight, if possible.  When exposed to sunlight, patients should wear protective clothing, sunglasses, and sunscreen.  The patient was instructed to call the office immediately if the following severe adverse effects occur:  hearing changes, easy bruising/bleeding, severe headache, or vision changes.  The patient verbalized understanding of the proper use and possible adverse effects of minocycline.  All of the patient's questions and concerns were addressed.
Topical Clindamycin Counseling: Patient counseled that this medication may cause skin irritation or allergic reactions.  In the event of skin irritation, the patient was advised to reduce the amount of the drug applied or use it less frequently.   The patient verbalized understanding of the proper use and possible adverse effects of clindamycin.  All of the patient's questions and concerns were addressed.
Clindamycin Counseling: I counseled the patient regarding use of clindamycin as an antibiotic for prophylactic and/or therapeutic purposes. Clindamycin is active against numerous classes of bacteria, including skin bacteria. Side effects may include nausea, diarrhea, gastrointestinal upset, rash, hives, yeast infections, and in rare cases, colitis.
Bexarotene Counseling:  I discussed with the patient the risks of bexarotene including but not limited to hair loss, dry lips/skin/eyes, liver abnormalities, hyperlipidemia, pancreatitis, depression/suicidal ideation, photosensitivity, drug rash/allergic reactions, hypothyroidism, anemia, leukopenia, infection, cataracts, and teratogenicity.  Patient understands that they will need regular blood tests to check lipid profile, liver function tests, white blood cell count, thyroid function tests and pregnancy test if applicable.
Finasteride Male Counseling: Finasteride Counseling:  I discussed with the patient the risks of use of finasteride including but not limited to decreased libido, decreased ejaculate volume, gynecomastia, and depression. Women should not handle medication.  All of the patient's questions and concerns were addressed.
Imiquimod Counseling:  I discussed with the patient the risks of imiquimod including but not limited to erythema, scaling, itching, weeping, crusting, and pain.  Patient understands that the inflammatory response to imiquimod is variable from person to person and was educated regarded proper titration schedule.  If flu-like symptoms develop, patient knows to discontinue the medication and contact us.
Cellcept Counseling:  I discussed with the patient the risks of mycophenolate mofetil including but not limited to infection/immunosuppression, GI upset, hypokalemia, hypercholesterolemia, bone marrow suppression, lymphoproliferative disorders, malignancy, GI ulceration/bleed/perforation, colitis, interstitial lung disease, kidney failure, progressive multifocal leukoencephalopathy, and birth defects.  The patient understands that monitoring is required including a baseline creatinine and regular CBC testing. In addition, patient must alert us immediately if symptoms of infection or other concerning signs are noted.
Opzelura Pregnancy And Lactation Text: There is insufficient data to evaluate drug-associated risk for major birth defects, miscarriage, or other adverse maternal or fetal outcomes.  There is a pregnancy registry that monitors pregnancy outcomes in pregnant persons exposed to the medication during pregnancy.  It is unknown if this medication is excreted in breast milk.  Do not breastfeed during treatment and for about 4 weeks after the last dose.
Cantharidin Pregnancy And Lactation Text: This medication has not been proven safe during pregnancy. It is unknown if this medication is excreted in breast milk.
Topical Sulfur Applications Pregnancy And Lactation Text: This medication is considered safe during pregnancy and breast feeding secondary to limited systemic absorption.
Aklief Pregnancy And Lactation Text: It is unknown if this medication is safe to use during pregnancy.  It is unknown if this medication is excreted in breast milk.  Breastfeeding women should use the topical cream on the smallest area of the skin for the shortest time needed while breastfeeding.  Do not apply to nipple and areola.
Valtrex Counseling: I discussed with the patient the risks of valacyclovir including but not limited to kidney damage, nausea, vomiting and severe allergy.  The patient understands that if the infection seems to be worsening or is not improving, they are to call.
Glycopyrrolate Counseling:  I discussed with the patient the risks of glycopyrrolate including but not limited to skin rash, drowsiness, dry mouth, difficulty urinating, and blurred vision.
Zyclara Counseling:  I discussed with the patient the risks of imiquimod including but not limited to erythema, scaling, itching, weeping, crusting, and pain.  Patient understands that the inflammatory response to imiquimod is variable from person to person and was educated regarded proper titration schedule.  If flu-like symptoms develop, patient knows to discontinue the medication and contact us.

## 2024-01-11 NOTE — PROCEDURE: HIGH RISK MEDICATION MONITORING
Terbinafine Pregnancy And Lactation Text: This medication is Pregnancy Category B and is considered safe during pregnancy. It is also excreted in breast milk and breast feeding isn't recommended.
Adbry Counseling: I discussed with the patient the risks of tralokinumab including but not limited to eye infection and irritation, cold sores, injection site reactions, worsening of asthma, allergic reactions and increased risk of parasitic infection.  Live vaccines should be avoided while taking tralokinumab. The patient understands that monitoring is required and they must alert us or the primary physician if symptoms of infection or other concerning signs are noted.
Clindamycin Counseling: I counseled the patient regarding use of clindamycin as an antibiotic for prophylactic and/or therapeutic purposes. Clindamycin is active against numerous classes of bacteria, including skin bacteria. Side effects may include nausea, diarrhea, gastrointestinal upset, rash, hives, yeast infections, and in rare cases, colitis.
Include Pregnancy/Lactation Warning?: No
Cephalexin Counseling: I counseled the patient regarding use of cephalexin as an antibiotic for prophylactic and/or therapeutic purposes. Cephalexin (commonly prescribed under brand name Keflex) is a cephalosporin antibiotic which is active against numerous classes of bacteria, including most skin bacteria. Side effects may include nausea, diarrhea, gastrointestinal upset, rash, hives, yeast infections, and in rare cases, hepatitis, kidney disease, seizures, fever, confusion, neurologic symptoms, and others. Patients with severe allergies to penicillin medications are cautioned that there is about a 10% incidence of cross-reactivity with cephalosporins. When possible, patients with penicillin allergies should use alternatives to cephalosporins for antibiotic therapy.
Bactrim Counseling:  I discussed with the patient the risks of sulfa antibiotics including but not limited to GI upset, allergic reaction, drug rash, diarrhea, dizziness, photosensitivity, and yeast infections.  Rarely, more serious reactions can occur including but not limited to aplastic anemia, agranulocytosis, methemoglobinemia, blood dyscrasias, liver or kidney failure, lung infiltrates or desquamative/blistering drug rashes.
Rifampin Pregnancy And Lactation Text: This medication is Pregnancy Category C and it isn't know if it is safe during pregnancy. It is also excreted in breast milk and should not be used if you are breast feeding.
Hydroxyzine Pregnancy And Lactation Text: This medication is not safe during pregnancy and should not be taken. It is also excreted in breast milk and breast feeding isn't recommended.
Sarecycline Pregnancy And Lactation Text: This medication is Pregnancy Category D and not consider safe during pregnancy. It is also excreted in breast milk.
Itraconazole Pregnancy And Lactation Text: This medication is Pregnancy Category C and it isn't know if it is safe during pregnancy. It is also excreted in breast milk.
Azithromycin Counseling:  I discussed with the patient the risks of azithromycin including but not limited to GI upset, allergic reaction, drug rash, diarrhea, and yeast infections.
Ketoconazole Pregnancy And Lactation Text: This medication is Pregnancy Category C and it isn't know if it is safe during pregnancy. It is also excreted in breast milk and breast feeding isn't recommended.
Doxepin Pregnancy And Lactation Text: This medication is Pregnancy Category C and it isn't known if it is safe during pregnancy. It is also excreted in breast milk and breast feeding isn't recommended.
Ivermectin Pregnancy And Lactation Text: This medication is Pregnancy Category C and it isn't known if it is safe during pregnancy. It is also excreted in breast milk.
Griseofulvin Pregnancy And Lactation Text: This medication is Pregnancy Category X and is known to cause serious birth defects. It is unknown if this medication is excreted in breast milk but breast feeding should be avoided.
Metronidazole Pregnancy And Lactation Text: This medication is Pregnancy Category B and considered safe during pregnancy.  It is also excreted in breast milk.
Erythromycin Pregnancy And Lactation Text: This medication is Pregnancy Category B and is considered safe during pregnancy. It is also excreted in breast milk.
Doxycycline Pregnancy And Lactation Text: This medication is Pregnancy Category D and not consider safe during pregnancy. It is also excreted in breast milk but is considered safe for shorter treatment courses.
Clindamycin Pregnancy And Lactation Text: This medication can be used in pregnancy if certain situations. Clindamycin is also present in breast milk.
Cephalexin Pregnancy And Lactation Text: This medication is Pregnancy Category B and considered safe during pregnancy.  It is also excreted in breast milk but can be used safely for shorter doses.
Tetracycline Counseling: Patient counseled regarding possible photosensitivity and increased risk for sunburn.  Patient instructed to avoid sunlight, if possible.  When exposed to sunlight, patients should wear protective clothing, sunglasses, and sunscreen.  The patient was instructed to call the office immediately if the following severe adverse effects occur:  hearing changes, easy bruising/bleeding, severe headache, or vision changes.  The patient verbalized understanding of the proper use and possible adverse effects of tetracycline.  All of the patient's questions and concerns were addressed. Patient understands to avoid pregnancy while on therapy due to potential birth defects.
Adbry Pregnancy And Lactation Text: It is unknown if this medication will adversely affect pregnancy or breast feeding.
Bactrim Pregnancy And Lactation Text: This medication is Pregnancy Category D and is known to cause fetal risk.  It is also excreted in breast milk.
Detail Level: Detailed
Itraconazole Counseling:  I discussed with the patient the risks of itraconazole including but not limited to liver damage, nausea/vomiting, neuropathy, and severe allergy.  The patient understands that this medication is best absorbed when taken with acidic beverages such as non-diet cola or ginger ale.  The patient understands that monitoring is required including baseline LFTs and repeat LFTs at intervals.  The patient understands that they are to contact us or the primary physician if concerning signs are noted.
Azithromycin Pregnancy And Lactation Text: This medication is considered safe during pregnancy and is also secreted in breast milk.
Doxepin Counseling:  Patient advised that the medication is sedating and not to drive a car after taking this medication. Patient informed of potential adverse effects including but not limited to dry mouth, urinary retention, and blurry vision.  The patient verbalized understanding of the proper use and possible adverse effects of doxepin.  All of the patient's questions and concerns were addressed.
Quinolones Counseling:  I discussed with the patient the risks of fluoroquinolones including but not limited to GI upset, allergic reaction, drug rash, diarrhea, dizziness, photosensitivity, yeast infections, liver function test abnormalities, tendonitis/tendon rupture.
Ketoconazole Counseling:   Patient counseled regarding improving absorption with orange juice.  Adverse effects include but are not limited to breast enlargement, headache, diarrhea, nausea, upset stomach, liver function test abnormalities, taste disturbance, and stomach pain.  There is a rare possibility of liver failure that can occur when taking ketoconazole. The patient understands that monitoring of LFTs may be required, especially at baseline. The patient verbalized understanding of the proper use and possible adverse effects of ketoconazole.  All of the patient's questions and concerns were addressed.
Terbinafine Counseling: Patient counseling regarding adverse effects of terbinafine including but not limited to headache, diarrhea, rash, upset stomach, liver function test abnormalities, itching, taste/smell disturbance, nausea, abdominal pain, and flatulence.  There is a rare possibility of liver failure that can occur when taking terbinafine.  The patient understands that a baseline LFT and kidney function test may be required. The patient verbalized understanding of the proper use and possible adverse effects of terbinafine.  All of the patient's questions and concerns were addressed.
Hydroxyzine Counseling: Patient advised that the medication is sedating and not to drive a car after taking this medication.  Patient informed of potential adverse effects including but not limited to dry mouth, urinary retention, and blurry vision.  The patient verbalized understanding of the proper use and possible adverse effects of hydroxyzine.  All of the patient's questions and concerns were addressed.
Sarecycline Counseling: Patient advised regarding possible photosensitivity and discoloration of the teeth, skin, lips, tongue and gums.  Patient instructed to avoid sunlight, if possible.  When exposed to sunlight, patients should wear protective clothing, sunglasses, and sunscreen.  The patient was instructed to call the office immediately if the following severe adverse effects occur:  hearing changes, easy bruising/bleeding, severe headache, or vision changes.  The patient verbalized understanding of the proper use and possible adverse effects of sarecycline.  All of the patient's questions and concerns were addressed.
Rifampin Counseling: I discussed with the patient the risks of rifampin including but not limited to liver damage, kidney damage, red-orange body fluids, nausea/vomiting and severe allergy.
Fluconazole Counseling:  Patient counseled regarding adverse effects of fluconazole including but not limited to headache, diarrhea, nausea, upset stomach, liver function test abnormalities, taste disturbance, and stomach pain.  There is a rare possibility of liver failure that can occur when taking fluconazole.  The patient understands that monitoring of LFTs and kidney function test may be required, especially at baseline. The patient verbalized understanding of the proper use and possible adverse effects of fluconazole.  All of the patient's questions and concerns were addressed.
Ivermectin Counseling:  Patient instructed to take medication on an empty stomach with a full glass of water.  Patient informed of potential adverse effects including but not limited to nausea, diarrhea, dizziness, itching, and swelling of the extremities or lymph nodes.  The patient verbalized understanding of the proper use and possible adverse effects of ivermectin.  All of the patient's questions and concerns were addressed.
Griseofulvin Counseling:  I discussed with the patient the risks of griseofulvin including but not limited to photosensitivity, cytopenia, liver damage, nausea/vomiting and severe allergy.  The patient understands that this medication is best absorbed when taken with a fatty meal (e.g., ice cream or french fries).
Cimetidine Counseling:  I discussed with the patient the risks of Cimetidine including but not limited to gynecomastia, headache, diarrhea, nausea, drowsiness, arrhythmias, pancreatitis, skin rashes, psychosis, bone marrow suppression and kidney toxicity.
Albendazole Counseling:  I discussed with the patient the risks of albendazole including but not limited to cytopenia, kidney damage, nausea/vomiting and severe allergy.  The patient understands that this medication is being used in an off-label manner.
Minocycline Counseling: Patient advised regarding possible photosensitivity and discoloration of the teeth, skin, lips, tongue and gums.  Patient instructed to avoid sunlight, if possible.  When exposed to sunlight, patients should wear protective clothing, sunglasses, and sunscreen.  The patient was instructed to call the office immediately if the following severe adverse effects occur:  hearing changes, easy bruising/bleeding, severe headache, or vision changes.  The patient verbalized understanding of the proper use and possible adverse effects of minocycline.  All of the patient's questions and concerns were addressed.
Metronidazole Counseling:  I discussed with the patient the risks of metronidazole including but not limited to seizures, nausea/vomiting, a metallic taste in the mouth, nausea/vomiting and severe allergy.
Bimzelx Pregnancy And Lactation Text: This medication crosses the placenta and the safety is uncertain during pregnancy. It is unknown if this medication is present in breast milk.
Bimzelx Counseling:  I discussed with the patient the risks of Bimzelx including but not limited to depression, immunosuppression, allergic reactions and infections.  The patient understands that monitoring is required including a PPD at baseline and must alert us or the primary physician if symptoms of infection or other concerning signs are noted.
Erythromycin Counseling:  I discussed with the patient the risks of erythromycin including but not limited to GI upset, allergic reaction, drug rash, diarrhea, increase in liver enzymes, and yeast infections.
Doxycycline Counseling:  Patient counseled regarding possible photosensitivity and increased risk for sunburn.  Patient instructed to avoid sunlight, if possible.  When exposed to sunlight, patients should wear protective clothing, sunglasses, and sunscreen.  The patient was instructed to call the office immediately if the following severe adverse effects occur:  hearing changes, easy bruising/bleeding, severe headache, or vision changes.  The patient verbalized understanding of the proper use and possible adverse effects of doxycycline.  All of the patient's questions and concerns were addressed.
Dupixent Pregnancy And Lactation Text: This medication likely crosses the placenta but the risk for the fetus is uncertain. This medication is excreted in breast milk.
Niacinamide Counseling: I recommended taking niacin or niacinamide, also know as vitamin B3, twice daily. Recent evidence suggests that taking vitamin B3 (500 mg twice daily) can reduce the risk of actinic keratoses and non-melanoma skin cancers. Side effects of vitamin B3 include flushing and headache.
Rituxan Counseling:  I discussed with the patient the risks of Rituxan infusions. Side effects can include infusion reactions, severe drug rashes including mucocutaneous reactions, reactivation of latent hepatitis and other infections and rarely progressive multifocal leukoencephalopathy.  All of the patient's questions and concerns were addressed.
Opzelura Counseling:  I discussed with the patient the risks of Opzelura including but not limited to nasopharngitis, bronchitis, ear infection, eosinophila, hives, diarrhea, folliculitis, tonsillitis, and rhinorrhea.  Taken orally, this medication has been linked to serious infections; higher rate of mortality; malignancy and lymphoproliferative disorders; major adverse cardiovascular events; thrombosis; thrombocytopenia, anemia, and neutropenia; and lipid elevations.
Otezla Pregnancy And Lactation Text: This medication is Pregnancy Category C and it isn't known if it is safe during pregnancy. It is unknown if it is excreted in breast milk.
Calcipotriene Pregnancy And Lactation Text: This medication has not been proven safe during pregnancy. It is unknown if this medication is excreted in breast milk.
Solaraze Pregnancy And Lactation Text: This medication is Pregnancy Category B and is considered safe. There is some data to suggest avoiding during the third trimester. It is unknown if this medication is excreted in breast milk.
Topical Retinoid Pregnancy And Lactation Text: This medication is Pregnancy Category C. It is unknown if this medication is excreted in breast milk.
Gabapentin Counseling: I discussed with the patient the risks of gabapentin including but not limited to dizziness, somnolence, fatigue and ataxia.
Acitretin Counseling:  I discussed with the patient the risks of acitretin including but not limited to hair loss, dry lips/skin/eyes, liver damage, hyperlipidemia, depression/suicidal ideation, photosensitivity.  Serious rare side effects can include but are not limited to pancreatitis, pseudotumor cerebri, bony changes, clot formation/stroke/heart attack.  Patient understands that alcohol is contraindicated since it can result in liver toxicity and significantly prolong the elimination of the drug by many years.
Hydroquinone Counseling:  Patient advised that medication may result in skin irritation, lightening (hypopigmentation), dryness, and burning.  In the event of skin irritation, the patient was advised to reduce the amount of the drug applied or use it less frequently.  Rarely, spots that are treated with hydroquinone can become darker (pseudoochronosis).  Should this occur, patient instructed to stop medication and call the office. The patient verbalized understanding of the proper use and possible adverse effects of hydroquinone.  All of the patient's questions and concerns were addressed.
Wartpeel Counseling:  I discussed with the patient the risks of Wartpeel including but not limited to erythema, scaling, itching, weeping, crusting, and pain.
Olumiant Counseling: I discussed with the patient the risks of Olumiant therapy including but not limited to upper respiratory tract infections, shingles, cold sores, and nausea. Live vaccines should be avoided.  This medication has been linked to serious infections; higher rate of mortality; malignancy and lymphoproliferative disorders; major adverse cardiovascular events; thrombosis; gastrointestinal perforations; neutropenia; lymphopenia; anemia; liver enzyme elevations; and lipid elevations.
Cyclosporine Counseling:  I discussed with the patient the risks of cyclosporine including but not limited to hypertension, gingival hyperplasia,myelosuppression, immunosuppression, liver damage, kidney damage, neurotoxicity, lymphoma, and serious infections. The patient understands that monitoring is required including baseline blood pressure, CBC, CMP, lipid panel and uric acid, and then 1-2 times monthly CMP and blood pressure.
Azathioprine Counseling:  I discussed with the patient the risks of azathioprine including but not limited to myelosuppression, immunosuppression, hepatotoxicity, lymphoma, and infections.  The patient understands that monitoring is required including baseline LFTs, Creatinine, possible TPMP genotyping and weekly CBCs for the first month and then every 2 weeks thereafter.  The patient verbalized understanding of the proper use and possible adverse effects of azathioprine.  All of the patient's questions and concerns were addressed.
Tranexamic Acid Counseling:  Patient advised of the small risk of bleeding problems with tranexamic acid. They were also instructed to call if they developed any nausea, vomiting or diarrhea. All of the patient's questions and concerns were addressed.
Enbrel Counseling:  I discussed with the patient the risks of etanercept including but not limited to myelosuppression, immunosuppression, autoimmune hepatitis, demyelinating diseases, lymphoma, and infections.  The patient understands that monitoring is required including a PPD at baseline and must alert us or the primary physician if symptoms of infection or other concerning signs are noted.
Niacinamide Pregnancy And Lactation Text: These medications are considered safe during pregnancy.
Stelara Counseling:  I discussed with the patient the risks of ustekinumab including but not limited to immunosuppression, malignancy, posterior leukoencephalopathy syndrome, and serious infections.  The patient understands that monitoring is required including a PPD at baseline and must alert us or the primary physician if symptoms of infection or other concerning signs are noted.
Arava Counseling:  Patient counseled regarding adverse effects of Arava including but not limited to nausea, vomiting, abnormalities in liver function tests. Patients may develop mouth sores, rash, diarrhea, and abnormalities in blood counts. The patient understands that monitoring is required including LFTs and blood counts.  There is a rare possibility of scarring of the liver and lung problems that can occur when taking methotrexate. Persistent nausea, loss of appetite, pale stools, dark urine, cough, and shortness of breath should be reported immediately. Patient advised to discontinue Arava treatment and consult with a physician prior to attempting conception. The patient will have to undergo a treatment to eliminate Arava from the body prior to conception.
Dutasteride Male Counseling: Dustasteride Counseling:  I discussed with the patient the risks of use of dutasteride including but not limited to decreased libido, decreased ejaculate volume, and gynecomastia. Women who can become pregnant should not handle medication.  All of the patient's questions and concerns were addressed.
Opzelura Pregnancy And Lactation Text: There is insufficient data to evaluate drug-associated risk for major birth defects, miscarriage, or other adverse maternal or fetal outcomes.  There is a pregnancy registry that monitors pregnancy outcomes in pregnant persons exposed to the medication during pregnancy.  It is unknown if this medication is excreted in breast milk.  Do not breastfeed during treatment and for about 4 weeks after the last dose.
Xolair Pregnancy And Lactation Text: This medication is Pregnancy Category B and is considered safe during pregnancy. This medication is excreted in breast milk.
5-Fu Counseling: 5-Fluorouracil Counseling:  I discussed with the patient the risks of 5-fluorouracil including but not limited to erythema, scaling, itching, weeping, crusting, and pain.
Oxybutynin Counseling:  I discussed with the patient the risks of oxybutynin including but not limited to skin rash, drowsiness, dry mouth, difficulty urinating, and blurred vision.
Acitretin Pregnancy And Lactation Text: This medication is Pregnancy Category X and should not be given to women who are pregnant or may become pregnant in the future. This medication is excreted in breast milk.
Tazorac Counseling:  Patient advised that medication is irritating and drying.  Patient may need to apply sparingly and wash off after an hour before eventually leaving it on overnight.  The patient verbalized understanding of the proper use and possible adverse effects of tazorac.  All of the patient's questions and concerns were addressed.
Hydroquinone Pregnancy And Lactation Text: This medication has not been assigned a Pregnancy Risk Category but animal studies failed to show danger with the topical medication. It is unknown if the medication is excreted in breast milk.
Gabapentin Pregnancy And Lactation Text: This medication is Pregnancy Category C and isn't considered safe during pregnancy. It is excreted in breast milk.
Olumiant Pregnancy And Lactation Text: Based on animal studies, Olumiant may cause embryo-fetal harm when administered to pregnant women.  The medication should not be used in pregnancy.  Breastfeeding is not recommended during treatment.
Azelaic Acid Counseling: Patient counseled that medicine may cause skin irritation and to avoid applying near the eyes.  In the event of skin irritation, the patient was advised to reduce the amount of the drug applied or use it less frequently.   The patient verbalized understanding of the proper use and possible adverse effects of azelaic acid.  All of the patient's questions and concerns were addressed.
Cyclosporine Pregnancy And Lactation Text: This medication is Pregnancy Category C and it isn't know if it is safe during pregnancy. This medication is excreted in breast milk.
Rituxan Pregnancy And Lactation Text: This medication is Pregnancy Category C and it isn't know if it is safe during pregnancy. It is unknown if this medication is excreted in breast milk but similar antibodies are known to be excreted.
Wartpeel Pregnancy And Lactation Text: This medication is Pregnancy Category X and contraindicated in pregnancy and in women who may become pregnant. It is unknown if this medication is excreted in breast milk.
Dutasteride Pregnancy And Lactation Text: This medication is absolutely contraindicated in women, especially during pregnancy and breast feeding. Feminization of male fetuses is possible if taking while pregnant.
Enbrel Pregnancy And Lactation Text: This medication is Pregnancy Category B and is considered safe during pregnancy. It is unknown if this medication is excreted in breast milk.
Tranexamic Acid Pregnancy And Lactation Text: It is unknown if this medication is safe during pregnancy or breast feeding.
Picato Counseling:  I discussed with the patient the risks of Picato including but not limited to erythema, scaling, itching, weeping, crusting, and pain.
Arava Pregnancy And Lactation Text: This medication is Pregnancy Category X and is absolutely contraindicated during pregnancy. It is unknown if it is excreted in breast milk.
Nsaids Counseling: NSAID Counseling: I discussed with the patient that NSAIDs should be taken with food. Prolonged use of NSAIDs can result in the development of stomach ulcers.  Patient advised to stop taking NSAIDs if abdominal pain occurs.  The patient verbalized understanding of the proper use and possible adverse effects of NSAIDs.  All of the patient's questions and concerns were addressed.
Azathioprine Pregnancy And Lactation Text: This medication is Pregnancy Category D and isn't considered safe during pregnancy. It is unknown if this medication is excreted in breast milk.
Oxybutynin Pregnancy And Lactation Text: This medication is Pregnancy Category B and is considered safe during pregnancy. It is unknown if it is excreted in breast milk.
Bexarotene Counseling:  I discussed with the patient the risks of bexarotene including but not limited to hair loss, dry lips/skin/eyes, liver abnormalities, hyperlipidemia, pancreatitis, depression/suicidal ideation, photosensitivity, drug rash/allergic reactions, hypothyroidism, anemia, leukopenia, infection, cataracts, and teratogenicity.  Patient understands that they will need regular blood tests to check lipid profile, liver function tests, white blood cell count, thyroid function tests and pregnancy test if applicable.
Tazorac Pregnancy And Lactation Text: This medication is not safe during pregnancy. It is unknown if this medication is excreted in breast milk.
Glycopyrrolate Counseling:  I discussed with the patient the risks of glycopyrrolate including but not limited to skin rash, drowsiness, dry mouth, difficulty urinating, and blurred vision.
Siliq Counseling:  I discussed with the patient the risks of Siliq including but not limited to new or worsening depression, suicidal thoughts and behavior, immunosuppression, malignancy, posterior leukoencephalopathy syndrome, and serious infections.  The patient understands that monitoring is required including a PPD at baseline and must alert us or the primary physician if symptoms of infection or other concerning signs are noted. There is also a special program designed to monitor depression which is required with Siliq.
Cimzia Counseling:  I discussed with the patient the risks of Cimzia including but not limited to immunosuppression, allergic reactions and infections.  The patient understands that monitoring is required including a PPD at baseline and must alert us or the primary physician if symptoms of infection or other concerning signs are noted.
Imiquimod Counseling:  I discussed with the patient the risks of imiquimod including but not limited to erythema, scaling, itching, weeping, crusting, and pain.  Patient understands that the inflammatory response to imiquimod is variable from person to person and was educated regarded proper titration schedule.  If flu-like symptoms develop, patient knows to discontinue the medication and contact us.
Winlevi Counseling:  I discussed with the patient the risks of topical clascoterone including but not limited to erythema, scaling, itching, and stinging. Patient voiced their understanding.
Rinvoq Counseling: I discussed with the patient the risks of Rinvoq therapy including but not limited to upper respiratory tract infections, shingles, cold sores, bronchitis, nausea, cough, fever, acne, and headache. Live vaccines should be avoided.  This medication has been linked to serious infections; higher rate of mortality; malignancy and lymphoproliferative disorders; major adverse cardiovascular events; thrombosis; thrombocytopenia, anemia, and neutropenia; lipid elevations; liver enzyme elevations; and gastrointestinal perforations.
Methotrexate Counseling:  Patient counseled regarding adverse effects of methotrexate including but not limited to nausea, vomiting, abnormalities in liver function tests. Patients may develop mouth sores, rash, diarrhea, and abnormalities in blood counts. The patient understands that monitoring is required including LFT's and blood counts.  There is a rare possibility of scarring of the liver and lung problems that can occur when taking methotrexate. Persistent nausea, loss of appetite, pale stools, dark urine, cough, and shortness of breath should be reported immediately. Patient advised to discontinue methotrexate treatment at least three months before attempting to become pregnant.  I discussed the need for folate supplements while taking methotrexate.  These supplements can decrease side effects during methotrexate treatment. The patient verbalized understanding of the proper use and possible adverse effects of methotrexate.  All of the patient's questions and concerns were addressed.
Humira Counseling:  I discussed with the patient the risks of adalimumab including but not limited to myelosuppression, immunosuppression, autoimmune hepatitis, demyelinating diseases, lymphoma, and serious infections.  The patient understands that monitoring is required including a PPD at baseline and must alert us or the primary physician if symptoms of infection or other concerning signs are noted.
Valtrex Counseling: I discussed with the patient the risks of valacyclovir including but not limited to kidney damage, nausea, vomiting and severe allergy.  The patient understands that if the infection seems to be worsening or is not improving, they are to call.
Erivedge Counseling- I discussed with the patient the risks of Erivedge including but not limited to nausea, vomiting, diarrhea, constipation, weight loss, changes in the sense of taste, decreased appetite, muscle spasms, and hair loss.  The patient verbalized understanding of the proper use and possible adverse effects of Erivedge.  All of the patient's questions and concerns were addressed.
Azelaic Acid Pregnancy And Lactation Text: This medication is considered safe during pregnancy and breast feeding.
Nsaids Pregnancy And Lactation Text: These medications are considered safe up to 30 weeks gestation. It is excreted in breast milk.
Finasteride Male Counseling: Finasteride Counseling:  I discussed with the patient the risks of use of finasteride including but not limited to decreased libido, decreased ejaculate volume, gynecomastia, and depression. Women should not handle medication.  All of the patient's questions and concerns were addressed.
Clofazimine Counseling:  I discussed with the patient the risks of clofazimine including but not limited to skin and eye pigmentation, liver damage, nausea/vomiting, gastrointestinal bleeding and allergy.
Topical Clindamycin Counseling: Patient counseled that this medication may cause skin irritation or allergic reactions.  In the event of skin irritation, the patient was advised to reduce the amount of the drug applied or use it less frequently.   The patient verbalized understanding of the proper use and possible adverse effects of clindamycin.  All of the patient's questions and concerns were addressed.
Drysol Counseling:  I discussed with the patient the risks of drysol/aluminum chloride including but not limited to skin rash, itching, irritation, burning.
Propranolol Counseling:  I discussed with the patient the risks of propranolol including but not limited to low heart rate, low blood pressure, low blood sugar, restlessness and increased cold sensitivity. They should call the office if they experience any of these side effects.
Taltz Counseling: I discussed with the patient the risks of ixekizumab including but not limited to immunosuppression, serious infections, worsening of inflammatory bowel disease and drug reactions.  The patient understands that monitoring is required including a PPD at baseline and must alert us or the primary physician if symptoms of infection or other concerning signs are noted.
Rinvoq Pregnancy And Lactation Text: Based on animal studies, Rinvoq may cause embryo-fetal harm when administered to pregnant women.  The medication should not be used in pregnancy.  Breastfeeding is not recommended during treatment and for 6 days after the last dose.
Valtrex Pregnancy And Lactation Text: this medication is Pregnancy Category B and is considered safe during pregnancy. This medication is not directly found in breast milk but it's metabolite acyclovir is present.
Glycopyrrolate Pregnancy And Lactation Text: This medication is Pregnancy Category B and is considered safe during pregnancy. It is unknown if it is excreted breast milk.
Bexarotene Pregnancy And Lactation Text: This medication is Pregnancy Category X and should not be given to women who are pregnant or may become pregnant. This medication should not be used if you are breast feeding.
Winlevi Pregnancy And Lactation Text: This medication is considered safe during pregnancy and breastfeeding.
Benzoyl Peroxide Counseling: Patient counseled that medicine may cause skin irritation and bleach clothing.  In the event of skin irritation, the patient was advised to reduce the amount of the drug applied or use it less frequently.   The patient verbalized understanding of the proper use and possible adverse effects of benzoyl peroxide.  All of the patient's questions and concerns were addressed.
Siliq Pregnancy And Lactation Text: The risk during pregnancy and breastfeeding is uncertain with this medication.
Methotrexate Pregnancy And Lactation Text: This medication is Pregnancy Category X and is known to cause fetal harm. This medication is excreted in breast milk.
Finasteride Pregnancy And Lactation Text: This medication is absolutely contraindicated during pregnancy. It is unknown if it is excreted in breast milk.
Protopic Counseling: Patient may experience a mild burning sensation during topical application. Protopic is not approved in children less than 2 years of age. There have been case reports of hematologic and skin malignancies in patients using topical calcineurin inhibitors although causality is questionable.
Olanzapine Counseling- I discussed with the patient the common side effects of olanzapine including but are not limited to: lack of energy, dry mouth, increased appetite, sleepiness, tremor, constipation, dizziness, changes in behavior, or restlessness.  Explained that teenagers are more likely to experience headaches, abdominal pain, pain in the arms or legs, tiredness, and sleepiness.  Serious side effects include but are not limited: increased risk of death in elderly patients who are confused, have memory loss, or dementia-related psychosis; hyperglycemia; increased cholesterol and triglycerides; and weight gain.
Cimzia Pregnancy And Lactation Text: This medication crosses the placenta but can be considered safe in certain situations. Cimzia may be excreted in breast milk.
Propranolol Pregnancy And Lactation Text: This medication is Pregnancy Category C and it isn't known if it is safe during pregnancy. It is excreted in breast milk.
Isotretinoin Counseling: Patient should get monthly blood tests, not donate blood, not drive at night if vision affected, not share medication, and not undergo elective surgery for 6 months after tx completed. Side effects reviewed, pt to contact office should one occur.
Zyclara Counseling:  I discussed with the patient the risks of imiquimod including but not limited to erythema, scaling, itching, weeping, crusting, and pain.  Patient understands that the inflammatory response to imiquimod is variable from person to person and was educated regarded proper titration schedule.  If flu-like symptoms develop, patient knows to discontinue the medication and contact us.
Prednisone Counseling:  I discussed with the patient the risks of prolonged use of prednisone including but not limited to weight gain, insomnia, osteoporosis, mood changes, diabetes, susceptibility to infection, glaucoma and high blood pressure.  In cases where prednisone use is prolonged, patients should be monitored with blood pressure checks, serum glucose levels and an eye exam.  Additionally, the patient may need to be placed on GI prophylaxis, PCP prophylaxis, and calcium and vitamin D supplementation and/or a bisphosphonate.  The patient verbalized understanding of the proper use and the possible adverse effects of prednisone.  All of the patient's questions and concerns were addressed.
Hydroxychloroquine Counseling:  I discussed with the patient that a baseline ophthalmologic exam is needed at the start of therapy and every year thereafter while on therapy. A CBC may also be warranted for monitoring.  The side effects of this medication were discussed with the patient, including but not limited to agranulocytosis, aplastic anemia, seizures, rashes, retinopathy, and liver toxicity. Patient instructed to call the office should any adverse effect occur.  The patient verbalized understanding of the proper use and possible adverse effects of Plaquenil.  All the patient's questions and concerns were addressed.
Sotyktu Counseling:  I discussed the most common side effects of Sotyktu including: common cold, sore throat, sinus infections, cold sores, canker sores, folliculitis, and acne.  I also discussed more serious side effects of Sotyktu including but not limited to: serious allergic reactions; increased risk for infections such as TB; cancers such as lymphomas; rhabdomyolysis and elevated CPK; and elevated triglycerides and liver enzymes. 
Benzoyl Peroxide Pregnancy And Lactation Text: This medication is Pregnancy Category C. It is unknown if benzoyl peroxide is excreted in breast milk.
Ilumya Counseling: I discussed with the patient the risks of tildrakizumab including but not limited to immunosuppression, malignancy, posterior leukoencephalopathy syndrome, and serious infections.  The patient understands that monitoring is required including a PPD at baseline and must alert us or the primary physician if symptoms of infection or other concerning signs are noted.
Minoxidil Counseling: Minoxidil is a topical medication which can increase blood flow where it is applied. It is uncertain how this medication increases hair growth. Side effects are uncommon and include stinging and allergic reactions.
Birth Control Pills Counseling: Birth Control Pill Counseling: I discussed with the patient the potential side effects of OCPs including but not limited to increased risk of stroke, heart attack, thrombophlebitis, deep venous thrombosis, hepatic adenomas, breast changes, GI upset, headaches, and depression.  The patient verbalized understanding of the proper use and possible adverse effects of OCPs. All of the patient's questions and concerns were addressed.
Libtayo Counseling- I discussed with the patient the risks of Libtayo including but not limited to nausea, vomiting, diarrhea, and bone or muscle pain.  The patient verbalized understanding of the proper use and possible adverse effects of Libtayo.  All of the patient's questions and concerns were addressed.
Olanzapine Pregnancy And Lactation Text: This medication is pregnancy category C.   There are no adequate and well controlled trials with olanzapine in pregnant females.  Olanzapine should be used during pregnancy only if the potential benefit justifies the potential risk to the fetus.   In a study in lactating healthy women, olanzapine was excreted in breast milk.  It is recommended that women taking olanzapine should not breast feed.
Colchicine Counseling:  Patient counseled regarding adverse effects including but not limited to stomach upset (nausea, vomiting, stomach pain, or diarrhea).  Patient instructed to limit alcohol consumption while taking this medication.  Colchicine may reduce blood counts especially with prolonged use.  The patient understands that monitoring of kidney function and blood counts may be required, especially at baseline. The patient verbalized understanding of the proper use and possible adverse effects of colchicine.  All of the patient's questions and concerns were addressed.
Elidel Counseling: Patient may experience a mild burning sensation during topical application. Elidel is not approved in children less than 2 years of age. There have been case reports of hematologic and skin malignancies in patients using topical calcineurin inhibitors although causality is questionable.
Protopic Pregnancy And Lactation Text: This medication is Pregnancy Category C. It is unknown if this medication is excreted in breast milk when applied topically.
Simponi Counseling:  I discussed with the patient the risks of golimumab including but not limited to myelosuppression, immunosuppression, autoimmune hepatitis, demyelinating diseases, lymphoma, and serious infections.  The patient understands that monitoring is required including a PPD at baseline and must alert us or the primary physician if symptoms of infection or other concerning signs are noted.
Cellcept Counseling:  I discussed with the patient the risks of mycophenolate mofetil including but not limited to infection/immunosuppression, GI upset, hypokalemia, hypercholesterolemia, bone marrow suppression, lymphoproliferative disorders, malignancy, GI ulceration/bleed/perforation, colitis, interstitial lung disease, kidney failure, progressive multifocal leukoencephalopathy, and birth defects.  The patient understands that monitoring is required including a baseline creatinine and regular CBC testing. In addition, patient must alert us immediately if symptoms of infection or other concerning signs are noted.
Topical Ketoconazole Counseling: Patient counseled that this medication may cause skin irritation or allergic reactions.  In the event of skin irritation, the patient was advised to reduce the amount of the drug applied or use it less frequently.   The patient verbalized understanding of the proper use and possible adverse effects of ketoconazole.  All of the patient's questions and concerns were addressed.
SSKI Counseling:  I discussed with the patient the risks of SSKI including but not limited to thyroid abnormalities, metallic taste, GI upset, fever, headache, acne, arthralgias, paraesthesias, lymphadenopathy, easy bleeding, arrhythmias, and allergic reaction.
Cosentyx Counseling:  I discussed with the patient the risks of Cosentyx including but not limited to worsening of Crohn's disease, immunosuppression, allergic reactions and infections.  The patient understands that monitoring is required including a PPD at baseline and must alert us or the primary physician if symptoms of infection or other concerning signs are noted.
Isotretinoin Pregnancy And Lactation Text: This medication is Pregnancy Category X and is considered extremely dangerous during pregnancy. It is unknown if it is excreted in breast milk.
Sotyktu Pregnancy And Lactation Text: There is insufficient data to evaluate whether or not Sotyktu is safe to use during pregnancy.   It is not known if Sotyktu passes into breast milk and whether or not it is safe to use when breastfeeding.  
Hydroxychloroquine Pregnancy And Lactation Text: This medication has been shown to cause fetal harm but it isn't assigned a Pregnancy Risk Category. There are small amounts excreted in breast milk.
Carac Counseling:  I discussed with the patient the risks of Carac including but not limited to erythema, scaling, itching, weeping, crusting, and pain.
Tremfya Counseling: I discussed with the patient the risks of guselkumab including but not limited to immunosuppression, serious infections, and drug reactions.  The patient understands that monitoring is required including a PPD at baseline and must alert us or the primary physician if symptoms of infection or other concerning signs are noted.
Birth Control Pills Pregnancy And Lactation Text: This medication should be avoided if pregnant and for the first 30 days post-partum.
Libtayo Pregnancy And Lactation Text: This medication is contraindicated in pregnancy and when breast feeding.
Oral Minoxidil Counseling- I discussed with the patient the risks of oral minoxidil including but not limited to shortness of breath, swelling of the feet or ankles, dizziness, lightheadedness, unwanted hair growth and allergic reaction.  The patient verbalized understanding of the proper use and possible adverse effects of oral minoxidil.  All of the patient's questions and concerns were addressed.
Rhofade Counseling: Rhofade is a topical medication which can decrease superficial blood flow where applied. Side effects are uncommon and include stinging, redness and allergic reactions.
Sski Pregnancy And Lactation Text: This medication is Pregnancy Category D and isn't considered safe during pregnancy. It is excreted in breast milk.
High Dose Vitamin A Counseling: Side effects reviewed, pt to contact office should one occur.
Xeljanz Counseling: I discussed with the patient the risks of Xeljanz therapy including increased risk of infection, liver issues, headache, diarrhea, or cold symptoms. Live vaccines should be avoided. They were instructed to call if they have any problems.
Mirvaso Counseling: Mirvaso is a topical medication which can decrease superficial blood flow where applied. Side effects are uncommon and include stinging, redness and allergic reactions.
Low Dose Naltrexone Counseling- I discussed with the patient the potential risks and side effects of low dose naltrexone including but not limited to: more vivid dreams, headaches, nausea, vomiting, abdominal pain, fatigue, dizziness, and anxiety.
Rhofade Pregnancy And Lactation Text: This medication has not been assigned a Pregnancy Risk Category. It is unknown if the medication is excreted in breast milk.
Oral Minoxidil Pregnancy And Lactation Text: This medication should only be used when clearly needed if you are pregnant, attempting to become pregnant or breast feeding.
Infliximab Counseling:  I discussed with the patient the risks of infliximab including but not limited to myelosuppression, immunosuppression, autoimmune hepatitis, demyelinating diseases, lymphoma, and serious infections.  The patient understands that monitoring is required including a PPD at baseline and must alert us or the primary physician if symptoms of infection or other concerning signs are noted.
Spironolactone Counseling: Patient advised regarding risks of diarrhea, abdominal pain, hyperkalemia, birth defects (for female patients), liver toxicity and renal toxicity. The patient may need blood work to monitor liver and kidney function and potassium levels while on therapy. The patient verbalized understanding of the proper use and possible adverse effects of spironolactone.  All of the patient's questions and concerns were addressed.
Odomzo Counseling- I discussed with the patient the risks of Odomzo including but not limited to nausea, vomiting, diarrhea, constipation, weight loss, changes in the sense of taste, decreased appetite, muscle spasms, and hair loss.  The patient verbalized understanding of the proper use and possible adverse effects of Odomzo.  All of the patient's questions and concerns were addressed.
Skyrizi Counseling: I discussed with the patient the risks of risankizumab-rzaa including but not limited to immunosuppression, and serious infections.  The patient understands that monitoring is required including a PPD at baseline and must alert us or the primary physician if symptoms of infection or other concerning signs are noted.
Opioid Counseling: I discussed with the patient the potential side effects of opioids including but not limited to addiction, altered mental status, and depression. I stressed avoiding alcohol, benzodiazepines, muscle relaxants and sleep aids unless specifically okayed by a physician. The patient verbalized understanding of the proper use and possible adverse effects of opioids. All of the patient's questions and concerns were addressed. They were instructed to flush the remaining pills down the toilet if they did not need them for pain.
Dapsone Counseling: I discussed with the patient the risks of dapsone including but not limited to hemolytic anemia, agranulocytosis, rashes, methemoglobinemia, kidney failure, peripheral neuropathy, headaches, GI upset, and liver toxicity.  Patients who start dapsone require monitoring including baseline LFTs and weekly CBCs for the first month, then every month thereafter.  The patient verbalized understanding of the proper use and possible adverse effects of dapsone.  All of the patient's questions and concerns were addressed.
Cyclophosphamide Counseling:  I discussed with the patient the risks of cyclophosphamide including but not limited to hair loss, hormonal abnormalities, decreased fertility, abdominal pain, diarrhea, nausea and vomiting, bone marrow suppression and infection. The patient understands that monitoring is required while taking this medication.
Cibinqo Counseling: I discussed with the patient the risks of Cibinqo therapy including but not limited to common cold, nausea, headache, cold sores, increased blood CPK levels, dizziness, UTIs, fatigue, acne, and vomitting. Live vaccines should be avoided.  This medication has been linked to serious infections; higher rate of mortality; malignancy and lymphoproliferative disorders; major adverse cardiovascular events; thrombosis; thrombocytopenia and lymphopenia; lipid elevations; and retinal detachment.
Eucrisa Counseling: Patient may experience a mild burning sensation during topical application. Eucrisa is not approved in children less than 3 months of age.
Topical Sulfur Applications Counseling: Topical Sulfur Counseling: Patient counseled that this medication may cause skin irritation or allergic reactions.  In the event of skin irritation, the patient was advised to reduce the amount of the drug applied or use it less frequently.   The patient verbalized understanding of the proper use and possible adverse effects of topical sulfur application.  All of the patient's questions and concerns were addressed.
Dupixent Counseling: I discussed with the patient the risks of dupilumab including but not limited to eye inflammation and irritation, cold sores, injection site reactions, allergic reactions and increased risk of parasitic infection. The patient understands that monitoring is required and they must alert us or the primary physician if symptoms of infection or other concerning signs are noted.
Thalidomide Counseling: I discussed with the patient the risks of thalidomide including but not limited to birth defects, anxiety, weakness, chest pain, dizziness, cough and severe allergy.
Low Dose Naltrexone Pregnancy And Lactation Text: Naltrexone is pregnancy category C.  There have been no adequate and well-controlled studies in pregnant women.  It should be used in pregnancy only if the potential benefit justifies the potential risk to the fetus.   Limited data indicates that naltrexone is minimally excreted into breastmilk.
Xeledaz Pregnancy And Lactation Text: This medication is Pregnancy Category D and is not considered safe during pregnancy.  The risk during breast feeding is also uncertain.
High Dose Vitamin A Pregnancy And Lactation Text: High dose vitamin A therapy is contraindicated during pregnancy and breast feeding.
Xolair Counseling:  Patient informed of potential adverse effects including but not limited to fever, muscle aches, rash and allergic reactions.  The patient verbalized understanding of the proper use and possible adverse effects of Xolair.  All of the patient's questions and concerns were addressed.
Calcipotriene Counseling:  I discussed with the patient the risks of calcipotriene including but not limited to erythema, scaling, itching, and irritation.
Topical Retinoid counseling:  Patient advised to apply a pea-sized amount only at bedtime and wait 30 minutes after washing their face before applying.  If too drying, patient may add a non-comedogenic moisturizer. The patient verbalized understanding of the proper use and possible adverse effects of retinoids.  All of the patient's questions and concerns were addressed.
Solaraze Counseling:  I discussed with the patient the risks of Solaraze including but not limited to erythema, scaling, itching, weeping, crusting, and pain.
Spironolactone Pregnancy And Lactation Text: This medication can cause feminization of the male fetus and should be avoided during pregnancy. The active metabolite is also found in breast milk.
Otezla Counseling: The side effects of Otezla were discussed with the patient, including but not limited to worsening or new depression, weight loss, diarrhea, nausea, upper respiratory tract infection, and headache. Patient instructed to call the office should any adverse effect occur.  The patient verbalized understanding of the proper use and possible adverse effects of Otezla.  All the patient's questions and concerns were addressed.
Opioid Pregnancy And Lactation Text: These medications can lead to premature delivery and should be avoided during pregnancy. These medications are also present in breast milk in small amounts.
Cibinqo Pregnancy And Lactation Text: It is unknown if this medication will adversely affect pregnancy or breast feeding.  You should not take this medication if you are currently pregnant or planning a pregnancy or while breastfeeding.
Dapsone Pregnancy And Lactation Text: This medication is Pregnancy Category C and is not considered safe during pregnancy or breast feeding.
Cyclophosphamide Pregnancy And Lactation Text: This medication is Pregnancy Category D and it isn't considered safe during pregnancy. This medication is excreted in breast milk.
Topical Sulfur Applications Pregnancy And Lactation Text: This medication is considered safe during pregnancy and breast feeding secondary to limited systemic absorption.

## 2024-01-11 NOTE — PROCEDURE: ADDITIONAL NOTES
Render Risk Assessment In Note?: no
Additional Notes: Improved greatly though the hairs in the front of her hairline have not returned. No breakage.
Detail Level: Simple

## 2024-01-11 NOTE — PROCEDURE: INTRALESIONAL KENALOG
How Many Mls Were Removed From The 40 Mg/Ml (5ml) Vial When Preparing The Injectable Solution?: 0
Detail Level: Detailed
Medical Necessity Clause: This procedure was medically necessary because the lesions that were treated were:
Validate Note Data When Using Inventory: Yes
Bill For Wasted Drug (Kenalog)?: no
Total Volume (Ccs): 1.0
Kenalog Type Of Vial: Multiple Dose
Expiration Date For Kendebbi (Optional): 10/2025
Concentration Of Kenalog Solution Injected (Mg/Ml): 5.0
Which Kenalog Vial Was Used?: Kenalog 10 mg/ml (5 ml vial)
Administered By (Optional): Suzan Reyes
Ndc# For Kenalog Only: 5031-4783-93
Kenalog Preparation: Kenalog
Consent: The risks of atrophy were reviewed with the patient.
Lot # For Kenalog (Optional): [id number]

## 2024-02-01 PROBLEM — 85057007: Status: ACTIVE | Noted: 2024-02-01

## 2024-02-07 ENCOUNTER — LAB (OUTPATIENT)
Dept: URBAN - METROPOLITAN AREA CLINIC 74 | Facility: CLINIC | Age: 69
End: 2024-02-07

## 2024-02-07 ENCOUNTER — OV EP (OUTPATIENT)
Dept: URBAN - METROPOLITAN AREA CLINIC 74 | Facility: CLINIC | Age: 69
End: 2024-02-07
Payer: MEDICARE

## 2024-02-07 VITALS
OXYGEN SATURATION: 98 % | HEART RATE: 88 BPM | WEIGHT: 161 LBS | BODY MASS INDEX: 27.49 KG/M2 | DIASTOLIC BLOOD PRESSURE: 82 MMHG | TEMPERATURE: 97.3 F | SYSTOLIC BLOOD PRESSURE: 130 MMHG | HEIGHT: 64 IN

## 2024-02-07 DIAGNOSIS — E66.3 OVERWEIGHT (BMI 25.0-29.9): ICD-10-CM

## 2024-02-07 DIAGNOSIS — K21.9 GERD WITHOUT ESOPHAGITIS: ICD-10-CM

## 2024-02-07 DIAGNOSIS — K66.8 MESENTERIC CYST: ICD-10-CM

## 2024-02-07 DIAGNOSIS — K57.30 DIVERTICULOSIS OF COLON WITHOUT DIVERTICULITIS: ICD-10-CM

## 2024-02-07 PROCEDURE — 99214 OFFICE O/P EST MOD 30 MIN: CPT | Performed by: INTERNAL MEDICINE

## 2024-02-07 RX ORDER — ALPRAZOLAM 0.5 MG/1
1 TABLET TABLET ORAL TWICE A DAY
Status: ACTIVE | COMMUNITY

## 2024-02-07 RX ORDER — FLUOXETINE HYDROCHLORIDE 40 MG/1
1 CAPSULE CAPSULE ORAL ONCE A DAY
Status: ACTIVE | COMMUNITY

## 2024-02-07 RX ORDER — PANTOPRAZOLE SODIUM 40 MG/1
1 TABLET TABLET, DELAYED RELEASE ORAL ONCE A DAY
OUTPATIENT

## 2024-02-07 RX ORDER — PANTOPRAZOLE SODIUM 40 MG/1
1 TABLET TABLET, DELAYED RELEASE ORAL ONCE A DAY
Status: ACTIVE | COMMUNITY
Start: 2021-10-25

## 2024-02-07 RX ORDER — FLUTICASONE PROPIONATE 50 UG/1
1 SPRAY IN EACH NOSTRIL SPRAY, METERED NASAL ONCE A DAY
Status: ACTIVE | COMMUNITY

## 2024-02-07 NOTE — HPI-TODAY'S VISIT:
This is a 66-year-old female referred by Dr Allen for a GI consultation of colon cancer screening and GERD and a copy of this note was sent to the referring provider.  She had been on Nexium in the past for GERD and was using a CPAP machine at night for sleep apnea but complaining of chronic cough..  Patient also had chronic constipation.  Patient had an EGD on July 5, 2016 that revealed some gastric erythema and erythema of the lower esophagus biopsies were taken.  Patient had a colonoscopy as well on the same day for first-degree relative with history of polyps and constipation and this revealed internal hemorrhoids otherwise normal exam Dr. Hong recommended repeat exam in 5 years.  Biopsy of the stomach showed reactive gastropathy but no H. pylori and esophageal biopsy was normal.  Patient also had a barium swallow on September 20, 2016 that was normal.  CT scan of the abdomen was done on August 9, 2016 which revealed a normal gallbladder, normal liver, normal pancreas. Pt say she goes about every other day in terms of her bowels.Has chronic cough. This year she saw ENT and pulmonary doctors. ENT did a full work up and negative. Pulmonary will f/u on a lung nodule. Pt has severe allergies and is being treated for this. protonix helps GERD. cough has improved a lot after medrol dosepak. not needing tessalon perles  Today June 1, 2023 the patient returns for a follow-up visit, the patient was last seen by Dr. Parish on October 3, 2022 with gastroesophageal reflux, chronic cough, sleep apnea, chronic idiopathic constipation, family history of colon polyps, the patient was to be scheduled for a colon cancer screening.  The patient has been treated with Nexium for GERD and was using the CPAP machine at night for sleep apnea but did complain of chronic cough.  The patient had chronic constipation.  The patient had an EGD on July 5, 2016 which revealed gastric erythema, erythema in the lower esophagus, biopsies were taken.  The patient was found to have reactive gastropathy H. pylori negative and the esophageal biopsies were normal.  The patient had a colonoscopy on the same day which revealed internal hemorrhoids.  At that time the patient was advised to get a colonoscopy in 5 years.  A barium swallow performed September 20, 2016 was normal.  A CT scan of the abdomen performed on August 9, 2016 revealed a normal gallbladder, normal liver, normal pancreas.  The patient had been seen by ENT and pulmonary medicine for chronic cough and the work-up was negative.  The patient had severe allergies and was being treated and along with it was taking Protonix for GERD.  The patient was suspected to have a multifactorial cough possibly allergies and bronchial spasm, in the past neutral Dosepak helped.  The patient was to be scheduled for an EGD and a colonoscopy.  The patient's EGD performed on October 27, 2022 revealed a 1 cm hiatal hernia mild chemical reactive gastropathy H. pylori negative and negative for intestinal metaplasia, squamocolumnar mucosa with reflux type changes.  On the same day the patient had a colonoscopy which revealed a 5 mm transverse sessile polyp which was hyperplastic.  The patient was found to have small nonbleeding internal hemorrhoids and sigmoid, descending colon and transverse colon diverticulosis with no evidence of diverticulitis or bleeding.  Today the patient returns to the office to get results from the previous colonoscopy and EGD performed by Dr. Parish.  The patient was made aware that on the EGD she was found to have a hiatal hernia, mild chemical reactive gastropathy H. pylori negative and negative for intestinal metaplasia, the esophagus showed reflux type changes with no evidence of Rojas's.  The colonoscopy revealed a 5 mm transverse sessile hyperplastic polyp, small nonbleeding internal hemorrhoids and diverticulosis throughout the sigmoid descending and transverse colon.  The patient states that as long as she follows antireflux measures and diet and takes pantoprazole 40 mg daily she has no acid reflux.  Otherwise she becomes symptomatic.  The patient denies having any dysphagia, odynophagia, there was no nausea or vomiting.  There was no evidence of GI bleeding.  Bowel movements remain type IV on the Reeves scale, there is no blood in the stool.  The patient was advised to follow antireflux measures and diet, continue to take pantoprazole 40 mg daily.  The patient's next colonoscopy will be due in 2027 in view of the family history of colonic polyps, she will remain on a high-fiber diet.  The patient inquired about her 2016 liver testing where she was found to have either a liver cyst or hemangioma, there has been no follow-up appointment, the patient will be scheduled to have an ultrasound of the right upper quadrant, she will be contacted with reports and recommendations.  As long as doing well the patient will return for follow-up visit in 1 year or as needed.  Today November 6, 2023 the patient returns for a follow-up visit, the patient was last seen on June 1, 2023 with GERD without esophagitis, chronic gastritis without bleeding, family history of colon polyps, colonic diverticulosis without diverticulitis, a hyperplastic transverse colon polyp, sleep apnea, chronic cough, liver lesion, the patient was overweight.  At the time of the last visit the patient returns after having an EGD and a colonoscopy performed by Dr. Calloway, on the EGD she was found to have a hiatal hernia, mild chemical reactive gastropathy H. pylori negative, negative for intestinal metaplasia, the esophagus showed reflux type changes with no evidence of Rojas's. The colonoscopy revealed a 5 mm transverse sessile hyperplastic polyp, small nonbleeding internal hemorrhoids and diverticulosis throughout the sigmoid descending and transverse colon. The patient stated that as long as she followed  antireflux measures and diet and was taking pantoprazole 40 mg daily she had no acid reflux otherwise she became symptomatic. The patient denies having any dysphagia, odynophagia, there was no nausea or vomiting.  The patient denies having any GI bleeding.  The patient's bowel movements remain type IV on the Reeves scale, there was no blood in the stool. The patient at the time was advised to follow antireflux measures and diet, continue taking pantoprazole 40 mg daily.  The patient was advised to get a colonoscopy in 2027.  The patient did have family history of colon polyps.  The patient was advised to follow a high-fiber diet. The patient inquired about the 2016 liver testing where she was found to have a either a liver cyst or hemangioma, there had been no follow-up since.  The patient was scheduled to have a right upper quadrant ultrasound.  The ultrasound was performed on June 20, 2023 and it revealed a simple cyst in the left lobe of the liver measuring 1.2 x 1.0 x 0.8 cm, the patient had a normal common bile duct measuring 2 mm and no gallstones.  The right kidney was normal. A CT of the abdomen and pelvis performed on July 13, 2023 revealed a normal-looking liver, normal gallbladder, pancreas, spleen, adrenals.  The patient had moderate right-sided hydroureteronephrosis secondary to a 0.5 cm stone in the distal right ureter measuring 440 Hounsfield units, there were also significant infiltrative changes around the right kidney, there were additional nonobstructing stones in the right upper and mid kidney the patient's the patient's stomach, small bowel and colon appeared to be normal, the appendix was not identified, there was no inflammatory change.  The patient did have a posterior fusion of L4 and L5 with disc prostheses and postoperative changes as well as degenerative disc disease of L2-L3 and L3-L4.  Today the patient returns to the office stating that she is doing reasonably well, she had been seen by her OB/GYN physician for some left inguinal discomfort, currently she is having normal bowel movements, denies having any diarrhea, constipation, rectal bleeding or hematochezia.  She claims that as the day goes on she developed some left inguinal discomfort.The patient denied having any upper GI symptoms such as dysphagia, odynophagia, nausea, vomiting, heartburn. I discussed with the patient the 2016 liver ultrasound which revealed a 7 mm hemangioma, subsequently discussed with the patient the ultrasound performed in June 2023 which now revealed a 1.2 x 1.0 x 0.8 cm simple cyst with reverberation artifact in the left lobe of the liver, normal, bile duct and gallbladder, normal liver otherwise and the CT scan performed in July 2023 for a kidney stone that showed a normal liver.  The patient is very concerned about her liver, the patient agreed to get an MRI with and without contrast to clarify the type of lesion she does or not have and an alpha-fetoprotein.  We will contact the patient with reports of recommendations.  The patient will return for a follow-up visit in 6 months or as needed.  Today February 7, 2024 the patient returns for a follow-up visit, the patient was last seen on November 6, 2023 with GERD without esophagitis, chronic gastritis without bleeding, chronic cough, colonic diverticulosis without diverticulitis, history of a hyperplastic transverse colon polyp, family history of colon polyps, sleep apnea, the patient was overweight and had a liver lesion as well as kidney stones.  At the time of the last visit the patient appeared to be doing reasonably well, the patient had been seen by her OB/GYN physician for left inguinal discomfort. The patient was having normal bowel movements, denied having diarrhea or constipation, there was no rectal bleeding or hematochezia.  The patient stated that that inguinal discomfort appeared during the day as the day went on it got better.  The patient denied having any upper GI symptoms such as dysphagia, odynophagia, nausea vomiting or heartburn. I discussed with the patient 2016 liver ultrasound which revealed a 7 mm hemangioma, we also discussed the ultrasound performed June 2023 which revealed a 1.2 x 1.0 x 0.8 cm simple cyst in the left lobe of the liver, normal bile ducts, normal gallbladder. A CT scan performed July 2023 for a kidney stone showed a normal liver. The patient was extremely concerned about her liver condition and agreed to get an MRI with and without contrast. On November 6, 2023 the patient had a low alpha-fetoprotein of 1.0 The MRI performed on November 21, 2023 revealed a 1 cm cyst in the medial segment, a small nonenhancing structure of uncertain etiology difficult to localize on the coronal sequences but it did not appear to be a worrisome lesion, the gallbladder and bile ducts appeared to be normal, the pancreas, spleen, adrenals, kidneys were unremarkable there was no lymphadenopathy. In the root of the small bowel mesentery abutting the superior mesenteric artery there is an 11 mm cyst, it also abuts the third portion of the duodenum but there was no definite enhancement, the abdominal wall appeared to be normal, there was postoperative change from lumbar spinal surgery with small amount of fluid in the posterior soft tissues similar to previous CT. The radiologist recommended follow-up of the small cyst in the root of the small bowel mesentery since it was not seen on a CT scan of May 2016.  Today the patient returns to the office stating that she is doing reasonably well, denied having any abdominal pain, did complain of increasing gastroesophageal reflux and cough, mostly at night, the patient had stopped taking the pantoprazole and was not compliant with antireflux measures and diet.The patient has been advised to follow antireflux measures and diet and resume pantoprazole 40 mg daily.  The patient does have an ENT appointment scheduled. The patient is currently having normal bowel movements, there has been no evidence of GI bleeding. 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 The patient had a follow-up of CT scan performed on January 29, 2024 which again revealed a normal stomach, small bowel and colon besides a very redundant ascending colon crossing the midline with the cecum projecting in the left upper quadrant.  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  There was no lymphadenopathy, the liver otherwise gallbladder, pancreas spleen and adrenals appear to be normal the patient had bilateral nonobstructive nephrolithiasis. After discussing with the patient at length the previous findings we agreed to obtain a follow-up CT scan in 6 months, if the lesion remains unchanged he will then be followed in 12 to 24 months. The patient will return for a follow-up visit after her follow-up CT scan.

## 2024-02-15 ENCOUNTER — APPOINTMENT (RX ONLY)
Dept: URBAN - METROPOLITAN AREA OTHER 10 | Facility: OTHER | Age: 69
Setting detail: DERMATOLOGY
End: 2024-02-15

## 2024-02-15 DIAGNOSIS — L67.8 OTHER HAIR COLOR AND HAIR SHAFT ABNORMALITIES: ICD-10-CM

## 2024-02-15 DIAGNOSIS — L64.8 OTHER ANDROGENIC ALOPECIA: ICD-10-CM | Status: IMPROVED

## 2024-02-15 DIAGNOSIS — Z79.899 OTHER LONG TERM (CURRENT) DRUG THERAPY: ICD-10-CM

## 2024-02-15 PROBLEM — L65.9 NONSCARRING HAIR LOSS, UNSPECIFIED: Status: ACTIVE | Noted: 2024-02-15

## 2024-02-15 PROCEDURE — ? HIGH RISK MEDICATION MONITORING

## 2024-02-15 PROCEDURE — 99213 OFFICE O/P EST LOW 20 MIN: CPT | Mod: 25

## 2024-02-15 PROCEDURE — ? MEDICATION COUNSELING

## 2024-02-15 PROCEDURE — ? INTRALESIONAL KENALOG

## 2024-02-15 PROCEDURE — ? COUNSELING

## 2024-02-15 PROCEDURE — 11900 INJECT SKIN LESIONS </W 7: CPT

## 2024-02-15 PROCEDURE — ? ADDITIONAL NOTES

## 2024-02-15 PROCEDURE — ? PRESCRIPTION MEDICATION MANAGEMENT

## 2024-02-15 ASSESSMENT — LOCATION DETAILED DESCRIPTION DERM
LOCATION DETAILED: RIGHT MEDIAL FOREHEAD
LOCATION DETAILED: LEFT FOREHEAD
LOCATION DETAILED: HAIR
LOCATION DETAILED: RIGHT FOREHEAD
LOCATION DETAILED: LEFT INFERIOR LATERAL FOREHEAD
LOCATION DETAILED: RIGHT INFERIOR FOREHEAD

## 2024-02-15 ASSESSMENT — LOCATION ZONE DERM
LOCATION ZONE: FACE
LOCATION ZONE: SCALP

## 2024-02-15 ASSESSMENT — LOCATION SIMPLE DESCRIPTION DERM
LOCATION SIMPLE: RIGHT FOREHEAD
LOCATION SIMPLE: LEFT FOREHEAD
LOCATION SIMPLE: HAIR

## 2024-02-15 NOTE — PROCEDURE: PRESCRIPTION MEDICATION MANAGEMENT
Detail Level: Zone
Render In Strict Bullet Format?: No
Plan: Still using the Opzelura samples, has enough for 1 more month. Will plan to see in 1 month and discuss compound medication if desires.

## 2024-02-15 NOTE — PROCEDURE: INTRALESIONAL KENALOG
How Many Mls Were Removed From The 40 Mg/Ml (5ml) Vial When Preparing The Injectable Solution?: 0
Detail Level: Detailed
Medical Necessity Clause: This procedure was medically necessary because the lesions that were treated were:
Validate Note Data When Using Inventory: Yes
Bill For Wasted Drug (Kenalog)?: no
Total Volume (Ccs): 1.3
Kenalog Type Of Vial: Multiple Dose
Expiration Date For Kendebbi (Optional): 10/2025
Concentration Of Kenalog Solution Injected (Mg/Ml): 5.0
Which Kenalog Vial Was Used?: Kenalog 10 mg/ml (5 ml vial)
Administered By (Optional): Suzan Reyes
Ndc# For Kenalog Only: 7317-9195-57
Kenalog Preparation: Kenalog
Consent: The risks of atrophy were reviewed with the patient.
Lot # For Kenalog (Optional): [id number]

## 2024-04-02 NOTE — PHYSICAL EXAM EYES:
Conjuntivae and eyelids appear normal, Sclerae : White without injection Documents scanned into pt's media.

## 2024-04-08 ENCOUNTER — APPOINTMENT (RX ONLY)
Dept: URBAN - METROPOLITAN AREA OTHER 10 | Facility: OTHER | Age: 69
Setting detail: DERMATOLOGY
End: 2024-04-08

## 2024-04-08 DIAGNOSIS — L65.8 OTHER SPECIFIED NONSCARRING HAIR LOSS: ICD-10-CM

## 2024-04-08 PROCEDURE — ? MEDICATION COUNSELING

## 2024-04-08 PROCEDURE — ? ADDITIONAL NOTES

## 2024-04-08 PROCEDURE — 99214 OFFICE O/P EST MOD 30 MIN: CPT

## 2024-04-08 PROCEDURE — ? COUNSELING

## 2024-04-08 PROCEDURE — ? PRESCRIPTION MEDICATION MANAGEMENT

## 2024-04-08 PROCEDURE — ? PRESCRIPTION

## 2024-04-08 RX ORDER — BETAMETHASONE DIPROPIONATE 0.5 MG/ML
LOTION TOPICAL
Qty: 60 | Refills: 2 | Status: ERX | COMMUNITY
Start: 2024-04-08

## 2024-04-08 RX ADMIN — BETAMETHASONE DIPROPIONATE: 0.5 LOTION TOPICAL at 00:00

## 2024-04-08 ASSESSMENT — LOCATION DETAILED DESCRIPTION DERM
LOCATION DETAILED: LEFT FOREHEAD
LOCATION DETAILED: RIGHT FOREHEAD

## 2024-04-08 ASSESSMENT — LOCATION SIMPLE DESCRIPTION DERM
LOCATION SIMPLE: RIGHT FOREHEAD
LOCATION SIMPLE: LEFT FOREHEAD

## 2024-04-08 ASSESSMENT — LOCATION ZONE DERM: LOCATION ZONE: FACE

## 2024-04-08 NOTE — PROCEDURE: MEDICATION COUNSELING
Valtrex Counseling: I discussed with the patient the risks of valacyclovir including but not limited to kidney damage, nausea, vomiting and severe allergy.  The patient understands that if the infection seems to be worsening or is not improving, they are to call.
Terbinafine Counseling: Patient counseling regarding adverse effects of terbinafine including but not limited to headache, diarrhea, rash, upset stomach, liver function test abnormalities, itching, taste/smell disturbance, nausea, abdominal pain, and flatulence.  There is a rare possibility of liver failure that can occur when taking terbinafine.  The patient understands that a baseline LFT and kidney function test may be required. The patient verbalized understanding of the proper use and possible adverse effects of terbinafine.  All of the patient's questions and concerns were addressed.
Methotrexate Counseling:  Patient counseled regarding adverse effects of methotrexate including but not limited to nausea, vomiting, abnormalities in liver function tests. Patients may develop mouth sores, rash, diarrhea, and abnormalities in blood counts. The patient understands that monitoring is required including LFT's and blood counts.  There is a rare possibility of scarring of the liver and lung problems that can occur when taking methotrexate. Persistent nausea, loss of appetite, pale stools, dark urine, cough, and shortness of breath should be reported immediately. Patient advised to discontinue methotrexate treatment at least three months before attempting to become pregnant.  I discussed the need for folate supplements while taking methotrexate.  These supplements can decrease side effects during methotrexate treatment. The patient verbalized understanding of the proper use and possible adverse effects of methotrexate.  All of the patient's questions and concerns were addressed.
Odomzo Counseling- I discussed with the patient the risks of Odomzo including but not limited to nausea, vomiting, diarrhea, constipation, weight loss, changes in the sense of taste, decreased appetite, muscle spasms, and hair loss.  The patient verbalized understanding of the proper use and possible adverse effects of Odomzo.  All of the patient's questions and concerns were addressed.
Rhofade Pregnancy And Lactation Text: This medication has not been assigned a Pregnancy Risk Category. It is unknown if the medication is excreted in breast milk.
Bimzelx Pregnancy And Lactation Text: This medication crosses the placenta and the safety is uncertain during pregnancy. It is unknown if this medication is present in breast milk.
Minocycline Counseling: Patient advised regarding possible photosensitivity and discoloration of the teeth, skin, lips, tongue and gums.  Patient instructed to avoid sunlight, if possible.  When exposed to sunlight, patients should wear protective clothing, sunglasses, and sunscreen.  The patient was instructed to call the office immediately if the following severe adverse effects occur:  hearing changes, easy bruising/bleeding, severe headache, or vision changes.  The patient verbalized understanding of the proper use and possible adverse effects of minocycline.  All of the patient's questions and concerns were addressed.
5-Fu Pregnancy And Lactation Text: This medication is Pregnancy Category X and contraindicated in pregnancy and in women who may become pregnant. It is unknown if this medication is excreted in breast milk.
Humira Counseling:  I discussed with the patient the risks of adalimumab including but not limited to myelosuppression, immunosuppression, autoimmune hepatitis, demyelinating diseases, lymphoma, and serious infections.  The patient understands that monitoring is required including a PPD at baseline and must alert us or the primary physician if symptoms of infection or other concerning signs are noted.
Tetracycline Pregnancy And Lactation Text: This medication is Pregnancy Category D and not consider safe during pregnancy. It is also excreted in breast milk.
Otezla Counseling: The side effects of Otezla were discussed with the patient, including but not limited to worsening or new depression, weight loss, diarrhea, nausea, upper respiratory tract infection, and headache. Patient instructed to call the office should any adverse effect occur.  The patient verbalized understanding of the proper use and possible adverse effects of Otezla.  All the patient's questions and concerns were addressed.
Zyclara Pregnancy And Lactation Text: This medication is Pregnancy Category C. It is unknown if this medication is excreted in breast milk.
Taltz Pregnancy And Lactation Text: The risk during pregnancy and breastfeeding is uncertain with this medication.
Picato Counseling:  I discussed with the patient the risks of Picato including but not limited to erythema, scaling, itching, weeping, crusting, and pain.
Azelaic Acid Counseling: Patient counseled that medicine may cause skin irritation and to avoid applying near the eyes.  In the event of skin irritation, the patient was advised to reduce the amount of the drug applied or use it less frequently.   The patient verbalized understanding of the proper use and possible adverse effects of azelaic acid.  All of the patient's questions and concerns were addressed.
Cephalexin Pregnancy And Lactation Text: This medication is Pregnancy Category B and considered safe during pregnancy.  It is also excreted in breast milk but can be used safely for shorter doses.
Hydroxyzine Pregnancy And Lactation Text: This medication is not safe during pregnancy and should not be taken. It is also excreted in breast milk and breast feeding isn't recommended.
Drysol Counseling:  I discussed with the patient the risks of drysol/aluminum chloride including but not limited to skin rash, itching, irritation, burning.
Humira Pregnancy And Lactation Text: This medication is Pregnancy Category B and is considered safe during pregnancy. It is unknown if this medication is excreted in breast milk.
Detail Level: Simple
Bexarotene Counseling:  I discussed with the patient the risks of bexarotene including but not limited to hair loss, dry lips/skin/eyes, liver abnormalities, hyperlipidemia, pancreatitis, depression/suicidal ideation, photosensitivity, drug rash/allergic reactions, hypothyroidism, anemia, leukopenia, infection, cataracts, and teratogenicity.  Patient understands that they will need regular blood tests to check lipid profile, liver function tests, white blood cell count, thyroid function tests and pregnancy test if applicable.
Dapsone Counseling: I discussed with the patient the risks of dapsone including but not limited to hemolytic anemia, agranulocytosis, rashes, methemoglobinemia, kidney failure, peripheral neuropathy, headaches, GI upset, and liver toxicity.  Patients who start dapsone require monitoring including baseline LFTs and weekly CBCs for the first month, then every month thereafter.  The patient verbalized understanding of the proper use and possible adverse effects of dapsone.  All of the patient's questions and concerns were addressed.
Olumiant Pregnancy And Lactation Text: Based on animal studies, Olumiant may cause embryo-fetal harm when administered to pregnant women.  The medication should not be used in pregnancy.  Breastfeeding is not recommended during treatment.
Topical Clindamycin Pregnancy And Lactation Text: This medication is Pregnancy Category B and is considered safe during pregnancy. It is unknown if it is excreted in breast milk.
Low Dose Naltrexone Pregnancy And Lactation Text: Naltrexone is pregnancy category C.  There have been no adequate and well-controlled studies in pregnant women.  It should be used in pregnancy only if the potential benefit justifies the potential risk to the fetus.   Limited data indicates that naltrexone is minimally excreted into breastmilk.
Birth Control Pills Pregnancy And Lactation Text: This medication should be avoided if pregnant and for the first 30 days post-partum.
Siliq Counseling:  I discussed with the patient the risks of Siliq including but not limited to new or worsening depression, suicidal thoughts and behavior, immunosuppression, malignancy, posterior leukoencephalopathy syndrome, and serious infections.  The patient understands that monitoring is required including a PPD at baseline and must alert us or the primary physician if symptoms of infection or other concerning signs are noted. There is also a special program designed to monitor depression which is required with Siliq.
Wartpeel Counseling:  I discussed with the patient the risks of Wartpeel including but not limited to erythema, scaling, itching, weeping, crusting, and pain.
Niacinamide Counseling: I recommended taking niacin or niacinamide, also know as vitamin B3, twice daily. Recent evidence suggests that taking vitamin B3 (500 mg twice daily) can reduce the risk of actinic keratoses and non-melanoma skin cancers. Side effects of vitamin B3 include flushing and headache.
Rinvoq Counseling: I discussed with the patient the risks of Rinvoq therapy including but not limited to upper respiratory tract infections, shingles, cold sores, bronchitis, nausea, cough, fever, acne, and headache. Live vaccines should be avoided.  This medication has been linked to serious infections; higher rate of mortality; malignancy and lymphoproliferative disorders; major adverse cardiovascular events; thrombosis; thrombocytopenia, anemia, and neutropenia; lipid elevations; liver enzyme elevations; and gastrointestinal perforations.
Valtrex Pregnancy And Lactation Text: this medication is Pregnancy Category B and is considered safe during pregnancy. This medication is not directly found in breast milk but it's metabolite acyclovir is present.
Cimzia Counseling:  I discussed with the patient the risks of Cimzia including but not limited to immunosuppression, allergic reactions and infections.  The patient understands that monitoring is required including a PPD at baseline and must alert us or the primary physician if symptoms of infection or other concerning signs are noted.
Opioid Counseling: I discussed with the patient the potential side effects of opioids including but not limited to addiction, altered mental status, and depression. I stressed avoiding alcohol, benzodiazepines, muscle relaxants and sleep aids unless specifically okayed by a physician. The patient verbalized understanding of the proper use and possible adverse effects of opioids. All of the patient's questions and concerns were addressed. They were instructed to flush the remaining pills down the toilet if they did not need them for pain.
Odomzo Pregnancy And Lactation Text: This medication is Pregnancy Category X and is absolutely contraindicated during pregnancy. It is unknown if it is excreted in breast milk.
Spironolactone Counseling: Patient advised regarding risks of diarrhea, abdominal pain, hyperkalemia, birth defects (for female patients), liver toxicity and renal toxicity. The patient may need blood work to monitor liver and kidney function and potassium levels while on therapy. The patient verbalized understanding of the proper use and possible adverse effects of spironolactone.  All of the patient's questions and concerns were addressed.
Klisyri Counseling:  I discussed with the patient the risks of Klisyri including but not limited to erythema, scaling, itching, weeping, crusting, and pain.
Bexarotene Pregnancy And Lactation Text: This medication is Pregnancy Category X and should not be given to women who are pregnant or may become pregnant. This medication should not be used if you are breast feeding.
Methotrexate Pregnancy And Lactation Text: This medication is Pregnancy Category X and is known to cause fetal harm. This medication is excreted in breast milk.
Solaraze Counseling:  I discussed with the patient the risks of Solaraze including but not limited to erythema, scaling, itching, weeping, crusting, and pain.
Azelaic Acid Pregnancy And Lactation Text: This medication is considered safe during pregnancy and breast feeding.
Otezla Pregnancy And Lactation Text: This medication is Pregnancy Category C and it isn't known if it is safe during pregnancy. It is unknown if it is excreted in breast milk.
Tremfya Counseling: I discussed with the patient the risks of guselkumab including but not limited to immunosuppression, serious infections, worsening of inflammatory bowel disease and drug reactions.  The patient understands that monitoring is required including a PPD at baseline and must alert us or the primary physician if symptoms of infection or other concerning signs are noted.
Terbinafine Pregnancy And Lactation Text: This medication is Pregnancy Category B and is considered safe during pregnancy. It is also excreted in breast milk and breast feeding isn't recommended.
Topical Ketoconazole Counseling: Patient counseled that this medication may cause skin irritation or allergic reactions.  In the event of skin irritation, the patient was advised to reduce the amount of the drug applied or use it less frequently.   The patient verbalized understanding of the proper use and possible adverse effects of ketoconazole.  All of the patient's questions and concerns were addressed.
Hyrimoz Counseling:  I discussed with the patient the risks of adalimumab including but not limited to myelosuppression, immunosuppression, autoimmune hepatitis, demyelinating diseases, lymphoma, and serious infections.  The patient understands that monitoring is required including a PPD at baseline and must alert us or the primary physician if symptoms of infection or other concerning signs are noted.
Include Pregnancy/Lactation Warning?: No
Oxybutynin Counseling:  I discussed with the patient the risks of oxybutynin including but not limited to skin rash, drowsiness, dry mouth, difficulty urinating, and blurred vision.
Solaraze Pregnancy And Lactation Text: This medication is Pregnancy Category B and is considered safe. There is some data to suggest avoiding during the third trimester. It is unknown if this medication is excreted in breast milk.
Clindamycin Counseling: I counseled the patient regarding use of clindamycin as an antibiotic for prophylactic and/or therapeutic purposes. Clindamycin is active against numerous classes of bacteria, including skin bacteria. Side effects may include nausea, diarrhea, gastrointestinal upset, rash, hives, yeast infections, and in rare cases, colitis.
Propranolol Counseling:  I discussed with the patient the risks of propranolol including but not limited to low heart rate, low blood pressure, low blood sugar, restlessness and increased cold sensitivity. They should call the office if they experience any of these side effects.
Fluconazole Counseling:  Patient counseled regarding adverse effects of fluconazole including but not limited to headache, diarrhea, nausea, upset stomach, liver function test abnormalities, taste disturbance, and stomach pain.  There is a rare possibility of liver failure that can occur when taking fluconazole.  The patient understands that monitoring of LFTs and kidney function test may be required, especially at baseline. The patient verbalized understanding of the proper use and possible adverse effects of fluconazole.  All of the patient's questions and concerns were addressed.
Dapsone Pregnancy And Lactation Text: This medication is Pregnancy Category C and is not considered safe during pregnancy or breast feeding.
Propranolol Pregnancy And Lactation Text: This medication is Pregnancy Category C and it isn't known if it is safe during pregnancy. It is excreted in breast milk.
Fluconazole Pregnancy And Lactation Text: This medication is Pregnancy Category C and it isn't know if it is safe during pregnancy. It is also excreted in breast milk.
Quinolones Counseling:  I discussed with the patient the risks of fluoroquinolones including but not limited to GI upset, allergic reaction, drug rash, diarrhea, dizziness, photosensitivity, yeast infections, liver function test abnormalities, tendonitis/tendon rupture.
Opioid Pregnancy And Lactation Text: These medications can lead to premature delivery and should be avoided during pregnancy. These medications are also present in breast milk in small amounts.
Cimetidine Counseling:  I discussed with the patient the risks of Cimetidine including but not limited to gynecomastia, headache, diarrhea, nausea, drowsiness, arrhythmias, pancreatitis, skin rashes, psychosis, bone marrow suppression and kidney toxicity.
Benzoyl Peroxide Counseling: Patient counseled that medicine may cause skin irritation and bleach clothing.  In the event of skin irritation, the patient was advised to reduce the amount of the drug applied or use it less frequently.   The patient verbalized understanding of the proper use and possible adverse effects of benzoyl peroxide.  All of the patient's questions and concerns were addressed.
Cimzia Pregnancy And Lactation Text: This medication crosses the placenta but can be considered safe in certain situations. Cimzia may be excreted in breast milk.
Prednisone Counseling:  I discussed with the patient the risks of prolonged use of prednisone including but not limited to weight gain, insomnia, osteoporosis, mood changes, diabetes, susceptibility to infection, glaucoma and high blood pressure.  In cases where prednisone use is prolonged, patients should be monitored with blood pressure checks, serum glucose levels and an eye exam.  Additionally, the patient may need to be placed on GI prophylaxis, PCP prophylaxis, and calcium and vitamin D supplementation and/or a bisphosphonate.  The patient verbalized understanding of the proper use and the possible adverse effects of prednisone.  All of the patient's questions and concerns were addressed.
Niacinamide Pregnancy And Lactation Text: These medications are considered safe during pregnancy.
Spironolactone Pregnancy And Lactation Text: This medication can cause feminization of the male fetus and should be avoided during pregnancy. The active metabolite is also found in breast milk.
Albendazole Counseling:  I discussed with the patient the risks of albendazole including but not limited to cytopenia, kidney damage, nausea/vomiting and severe allergy.  The patient understands that this medication is being used in an off-label manner.
Protopic Counseling: Patient may experience a mild burning sensation during topical application. Protopic is not approved in children less than 2 years of age. There have been case reports of hematologic and skin malignancies in patients using topical calcineurin inhibitors although causality is questionable.
Simponi Counseling:  I discussed with the patient the risks of golimumab including but not limited to myelosuppression, immunosuppression, autoimmune hepatitis, demyelinating diseases, lymphoma, and serious infections.  The patient understands that monitoring is required including a PPD at baseline and must alert us or the primary physician if symptoms of infection or other concerning signs are noted.
Winlevi Counseling:  I discussed with the patient the risks of topical clascoterone including but not limited to erythema, scaling, itching, and stinging. Patient voiced their understanding.
Isotretinoin Counseling: Patient should get monthly blood tests, not donate blood, not drive at night if vision affected, not share medication, and not undergo elective surgery for 6 months after tx completed. Side effects reviewed, pt to contact office should one occur.
Klisyri Pregnancy And Lactation Text: It is unknown if this medication can harm a developing fetus or if it is excreted in breast milk.
Rinvoq Pregnancy And Lactation Text: Based on animal studies, Rinvoq may cause embryo-fetal harm when administered to pregnant women.  The medication should not be used in pregnancy.  Breastfeeding is not recommended during treatment and for 6 days after the last dose.
Albendazole Pregnancy And Lactation Text: This medication is Pregnancy Category C and it isn't known if it is safe during pregnancy. It is also excreted in breast milk.
Xolair Counseling:  Patient informed of potential adverse effects including but not limited to fever, muscle aches, rash and allergic reactions.  The patient verbalized understanding of the proper use and possible adverse effects of Xolair.  All of the patient's questions and concerns were addressed.
Prednisone Pregnancy And Lactation Text: This medication is Pregnancy Category C and it isn't know if it is safe during pregnancy. This medication is excreted in breast milk.
Soolantra Counseling: I discussed with the patients the risks of topial Soolantra. This is a medicine which decreases the number of mites and inflammation in the skin. You experience burning, stinging, eye irritation or allergic reactions.  Please call our office if you develop any problems from using this medication.
Cosentyx Counseling:  I discussed with the patient the risks of Cosentyx including but not limited to worsening of Crohn's disease, immunosuppression, allergic reactions and infections.  The patient understands that monitoring is required including a PPD at baseline and must alert us or the primary physician if symptoms of infection or other concerning signs are noted.
Benzoyl Peroxide Pregnancy And Lactation Text: This medication is Pregnancy Category C. It is unknown if benzoyl peroxide is excreted in breast milk.
Clindamycin Pregnancy And Lactation Text: This medication can be used in pregnancy if certain situations. Clindamycin is also present in breast milk.
Elidel Counseling: Patient may experience a mild burning sensation during topical application. Elidel is not approved in children less than 2 years of age. There have been case reports of hematologic and skin malignancies in patients using topical calcineurin inhibitors although causality is questionable.
Dutasteride Male Counseling: Dustasteride Counseling:  I discussed with the patient the risks of use of dutasteride including but not limited to decreased libido, decreased ejaculate volume, and gynecomastia. Women who can become pregnant should not handle medication.  All of the patient's questions and concerns were addressed.
Gabapentin Counseling: I discussed with the patient the risks of gabapentin including but not limited to dizziness, somnolence, fatigue and ataxia.
Dutasteride Female Counseling: Dutasteride Counseling:  I discussed with the patient the risks of use of dutasteride including but not limited to decreased libido and sexual dysfunction. Explained the teratogenic nature of the medication and stressed the importance of not getting pregnant during treatment. All of the patient's questions and concerns were addressed.
Gabapentin Pregnancy And Lactation Text: This medication is Pregnancy Category C and isn't considered safe during pregnancy. It is excreted in breast milk.
Ilumya Counseling: I discussed with the patient the risks of tildrakizumab including but not limited to immunosuppression, malignancy, posterior leukoencephalopathy syndrome, and serious infections.  The patient understands that monitoring is required including a PPD at baseline and must alert us or the primary physician if symptoms of infection or other concerning signs are noted.
Azathioprine Counseling:  I discussed with the patient the risks of azathioprine including but not limited to myelosuppression, immunosuppression, hepatotoxicity, lymphoma, and infections.  The patient understands that monitoring is required including baseline LFTs, Creatinine, possible TPMP genotyping and weekly CBCs for the first month and then every 2 weeks thereafter.  The patient verbalized understanding of the proper use and possible adverse effects of azathioprine.  All of the patient's questions and concerns were addressed.
Doxycycline Counseling:  Patient counseled regarding possible photosensitivity and increased risk for sunburn.  Patient instructed to avoid sunlight, if possible.  When exposed to sunlight, patients should wear protective clothing, sunglasses, and sunscreen.  The patient was instructed to call the office immediately if the following severe adverse effects occur:  hearing changes, easy bruising/bleeding, severe headache, or vision changes.  The patient verbalized understanding of the proper use and possible adverse effects of doxycycline.  All of the patient's questions and concerns were addressed.
Topical Metronidazole Counseling: Metronidazole is a topical antibiotic medication. You may experience burning, stinging, redness, or allergic reactions.  Please call our office if you develop any problems from using this medication.
Isotretinoin Pregnancy And Lactation Text: This medication is Pregnancy Category X and is considered extremely dangerous during pregnancy. It is unknown if it is excreted in breast milk.
Minoxidil Counseling: Minoxidil is a topical medication which can increase blood flow where it is applied. It is uncertain how this medication increases hair growth. Side effects are uncommon and include stinging and allergic reactions.
Sotyktu Counseling:  I discussed the most common side effects of Sotyktu including: common cold, sore throat, sinus infections, cold sores, canker sores, folliculitis, and acne.  I also discussed more serious side effects of Sotyktu including but not limited to: serious allergic reactions; increased risk for infections such as TB; cancers such as lymphomas; rhabdomyolysis and elevated CPK; and elevated triglycerides and liver enzymes. 
Nsaids Counseling: NSAID Counseling: I discussed with the patient that NSAIDs should be taken with food. Prolonged use of NSAIDs can result in the development of stomach ulcers.  Patient advised to stop taking NSAIDs if abdominal pain occurs.  The patient verbalized understanding of the proper use and possible adverse effects of NSAIDs.  All of the patient's questions and concerns were addressed.
Griseofulvin Counseling:  I discussed with the patient the risks of griseofulvin including but not limited to photosensitivity, cytopenia, liver damage, nausea/vomiting and severe allergy.  The patient understands that this medication is best absorbed when taken with a fatty meal (e.g., ice cream or french fries).
Winlevi Pregnancy And Lactation Text: This medication is considered safe during pregnancy and breastfeeding.
SSKI Counseling:  I discussed with the patient the risks of SSKI including but not limited to thyroid abnormalities, metallic taste, GI upset, fever, headache, acne, arthralgias, paraesthesias, lymphadenopathy, easy bleeding, arrhythmias, and allergic reaction.
VTAMA Counseling: I discussed with the patient that VTAMA is not for use in the eyes, mouth or mouth. They should call the office if they develop any signs of allergic reactions to VTAMA. The patient verbalized understanding of the proper use and possible adverse effects of VTAMA.  All of the patient's questions and concerns were addressed.
Sotyktu Pregnancy And Lactation Text: There is insufficient data to evaluate whether or not Sotyktu is safe to use during pregnancy.   It is not known if Sotyktu passes into breast milk and whether or not it is safe to use when breastfeeding.  
Soolantra Pregnancy And Lactation Text: This medication is Pregnancy Category C. This medication is considered safe during breast feeding.
Griseofulvin Pregnancy And Lactation Text: This medication is Pregnancy Category X and is known to cause serious birth defects. It is unknown if this medication is excreted in breast milk but breast feeding should be avoided.
High Dose Vitamin A Counseling: Side effects reviewed, pt to contact office should one occur.
Rifampin Counseling: I discussed with the patient the risks of rifampin including but not limited to liver damage, kidney damage, red-orange body fluids, nausea/vomiting and severe allergy.
Nsaids Pregnancy And Lactation Text: These medications are considered safe up to 30 weeks gestation. It is excreted in breast milk.
Xolair Pregnancy And Lactation Text: This medication is Pregnancy Category B and is considered safe during pregnancy. This medication is excreted in breast milk.
Arava Counseling:  Patient counseled regarding adverse effects of Arava including but not limited to nausea, vomiting, abnormalities in liver function tests. Patients may develop mouth sores, rash, diarrhea, and abnormalities in blood counts. The patient understands that monitoring is required including LFTs and blood counts.  There is a rare possibility of scarring of the liver and lung problems that can occur when taking methotrexate. Persistent nausea, loss of appetite, pale stools, dark urine, cough, and shortness of breath should be reported immediately. Patient advised to discontinue Arava treatment and consult with a physician prior to attempting conception. The patient will have to undergo a treatment to eliminate Arava from the body prior to conception.
Carac Counseling:  I discussed with the patient the risks of Carac including but not limited to erythema, scaling, itching, weeping, crusting, and pain.
Ivermectin Counseling:  Patient instructed to take medication on an empty stomach with a full glass of water.  Patient informed of potential adverse effects including but not limited to nausea, diarrhea, dizziness, itching, and swelling of the extremities or lymph nodes.  The patient verbalized understanding of the proper use and possible adverse effects of ivermectin.  All of the patient's questions and concerns were addressed.
Doxycycline Pregnancy And Lactation Text: This medication is Pregnancy Category D and not consider safe during pregnancy. It is also excreted in breast milk but is considered safe for shorter treatment courses.
Azathioprine Pregnancy And Lactation Text: This medication is Pregnancy Category D and isn't considered safe during pregnancy. It is unknown if this medication is excreted in breast milk.
Azithromycin Counseling:  I discussed with the patient the risks of azithromycin including but not limited to GI upset, allergic reaction, drug rash, diarrhea, and yeast infections.
Dutasteride Pregnancy And Lactation Text: This medication is absolutely contraindicated in women, especially during pregnancy and breast feeding. Feminization of male fetuses is possible if taking while pregnant.
Glycopyrrolate Counseling:  I discussed with the patient the risks of glycopyrrolate including but not limited to skin rash, drowsiness, dry mouth, difficulty urinating, and blurred vision.
Minoxidil Pregnancy And Lactation Text: This medication has not been assigned a Pregnancy Risk Category but animal studies failed to show danger with the topical medication. It is unknown if the medication is excreted in breast milk.
Doxepin Counseling:  Patient advised that the medication is sedating and not to drive a car after taking this medication. Patient informed of potential adverse effects including but not limited to dry mouth, urinary retention, and blurry vision.  The patient verbalized understanding of the proper use and possible adverse effects of doxepin.  All of the patient's questions and concerns were addressed.
Topical Metronidazole Pregnancy And Lactation Text: This medication is Pregnancy Category B and considered safe during pregnancy.  It is also considered safe to use while breastfeeding.
Sski Pregnancy And Lactation Text: This medication is Pregnancy Category D and isn't considered safe during pregnancy. It is excreted in breast milk.
Eucrisa Counseling: Patient may experience a mild burning sensation during topical application. Eucrisa is not approved in children less than 2 years of age.
Skyrizi Counseling: I discussed with the patient the risks of risankizumab-rzaa including but not limited to immunosuppression, and serious infections.  The patient understands that monitoring is required including a PPD at baseline and must alert us or the primary physician if symptoms of infection or other concerning signs are noted.
Cibinqo Counseling: I discussed with the patient the risks of Cibinqo therapy including but not limited to common cold, nausea, headache, cold sores, increased blood CPK levels, dizziness, UTIs, fatigue, acne, and vomitting. Live vaccines should be avoided.  This medication has been linked to serious infections; higher rate of mortality; malignancy and lymphoproliferative disorders; major adverse cardiovascular events; thrombosis; thrombocytopenia and lymphopenia; lipid elevations; and retinal detachment.
Itraconazole Counseling:  I discussed with the patient the risks of itraconazole including but not limited to liver damage, nausea/vomiting, neuropathy, and severe allergy.  The patient understands that this medication is best absorbed when taken with acidic beverages such as non-diet cola or ginger ale.  The patient understands that monitoring is required including baseline LFTs and repeat LFTs at intervals.  The patient understands that they are to contact us or the primary physician if concerning signs are noted.
Rifampin Pregnancy And Lactation Text: This medication is Pregnancy Category C and it isn't know if it is safe during pregnancy. It is also excreted in breast milk and should not be used if you are breast feeding.
Erivedge Counseling- I discussed with the patient the risks of Erivedge including but not limited to nausea, vomiting, diarrhea, constipation, weight loss, changes in the sense of taste, decreased appetite, muscle spasms, and hair loss.  The patient verbalized understanding of the proper use and possible adverse effects of Erivedge.  All of the patient's questions and concerns were addressed.
Protopic Pregnancy And Lactation Text: This medication is Pregnancy Category C. It is unknown if this medication is excreted in breast milk when applied topically.
Mirvaso Counseling: Mirvaso is a topical medication which can decrease superficial blood flow where applied. Side effects are uncommon and include stinging, redness and allergic reactions.
Doxepin Pregnancy And Lactation Text: This medication is Pregnancy Category C and it isn't known if it is safe during pregnancy. It is also excreted in breast milk and breast feeding isn't recommended.
Cyclophosphamide Counseling:  I discussed with the patient the risks of cyclophosphamide including but not limited to hair loss, hormonal abnormalities, decreased fertility, abdominal pain, diarrhea, nausea and vomiting, bone marrow suppression and infection. The patient understands that monitoring is required while taking this medication.
High Dose Vitamin A Pregnancy And Lactation Text: High dose vitamin A therapy is contraindicated during pregnancy and breast feeding.
Topical Retinoid counseling:  Patient advised to apply a pea-sized amount only at bedtime and wait 30 minutes after washing their face before applying.  If too drying, patient may add a non-comedogenic moisturizer. The patient verbalized understanding of the proper use and possible adverse effects of retinoids.  All of the patient's questions and concerns were addressed.
Dupixent Counseling: I discussed with the patient the risks of dupilumab including but not limited to eye infection and irritation, cold sores, injection site reactions, worsening of asthma, allergic reactions and increased risk of parasitic infection.  Live vaccines should be avoided while taking dupilumab. Dupilumab will also interact with certain medications such as warfarin and cyclosporine. The patient understands that monitoring is required and they must alert us or the primary physician if symptoms of infection or other concerning signs are noted.
Olanzapine Counseling- I discussed with the patient the common side effects of olanzapine including but are not limited to: lack of energy, dry mouth, increased appetite, sleepiness, tremor, constipation, dizziness, changes in behavior, or restlessness.  Explained that teenagers are more likely to experience headaches, abdominal pain, pain in the arms or legs, tiredness, and sleepiness.  Serious side effects include but are not limited: increased risk of death in elderly patients who are confused, have memory loss, or dementia-related psychosis; hyperglycemia; increased cholesterol and triglycerides; and weight gain.
Vtama Pregnancy And Lactation Text: It is unknown if this medication can cause problems during pregnancy and breastfeeding.
Xeljanz Counseling: I discussed with the patient the risks of Xeljanz therapy including increased risk of infection, liver issues, headache, diarrhea, or cold symptoms. Live vaccines should be avoided. They were instructed to call if they have any problems.
Clofazimine Counseling:  I discussed with the patient the risks of clofazimine including but not limited to skin and eye pigmentation, liver damage, nausea/vomiting, gastrointestinal bleeding and allergy.
Azithromycin Pregnancy And Lactation Text: This medication is considered safe during pregnancy and is also secreted in breast milk.
Infliximab Counseling:  I discussed with the patient the risks of infliximab including but not limited to myelosuppression, immunosuppression, autoimmune hepatitis, demyelinating diseases, lymphoma, and serious infections.  The patient understands that monitoring is required including a PPD at baseline and must alert us or the primary physician if symptoms of infection or other concerning signs are noted.
Xeledaz Pregnancy And Lactation Text: This medication is Pregnancy Category D and is not considered safe during pregnancy.  The risk during breast feeding is also uncertain.
Calcipotriene Counseling:  I discussed with the patient the risks of calcipotriene including but not limited to erythema, scaling, itching, and irritation.
Topical Steroids Counseling: I discussed with the patient that prolonged use of topical steroids can result in the increased appearance of superficial blood vessels (telangiectasias), lightening (hypopigmentation) and thinning of the skin (atrophy).  Patient understands to avoid using high potency steroids in skin folds, the groin or the face.  The patient verbalized understanding of the proper use and possible adverse effects of topical steroids.  All of the patient's questions and concerns were addressed.
Erythromycin Counseling:  I discussed with the patient the risks of erythromycin including but not limited to GI upset, allergic reaction, drug rash, diarrhea, increase in liver enzymes, and yeast infections.
Cibinqo Pregnancy And Lactation Text: It is unknown if this medication will adversely affect pregnancy or breast feeding.  You should not take this medication if you are currently pregnant or planning a pregnancy or while breastfeeding.
Thalidomide Counseling: I discussed with the patient the risks of thalidomide including but not limited to birth defects, anxiety, weakness, chest pain, dizziness, cough and severe allergy.
Finasteride Male Counseling: Finasteride Counseling:  I discussed with the patient the risks of use of finasteride including but not limited to decreased libido, decreased ejaculate volume, gynecomastia, and depression. Women should not handle medication.  All of the patient's questions and concerns were addressed.
Glycopyrrolate Pregnancy And Lactation Text: This medication is Pregnancy Category B and is considered safe during pregnancy. It is unknown if it is excreted breast milk.
Cellcept Counseling:  I discussed with the patient the risks of mycophenolate mofetil including but not limited to infection/immunosuppression, GI upset, hypokalemia, hypercholesterolemia, bone marrow suppression, lymphoproliferative disorders, malignancy, GI ulceration/bleed/perforation, colitis, interstitial lung disease, kidney failure, progressive multifocal leukoencephalopathy, and birth defects.  The patient understands that monitoring is required including a baseline creatinine and regular CBC testing. In addition, patient must alert us immediately if symptoms of infection or other concerning signs are noted.
Litfulo Counseling: I discussed with the patient the risks of Litfulo therapy including but not limited to upper respiratory tract infections, shingles, cold sores, and nausea. Live vaccines should be avoided.  This medication has been linked to serious infections; higher rate of mortality; malignancy and lymphoproliferative disorders; major adverse cardiovascular events; thrombosis; gastrointestinal perforations; neutropenia; lymphopenia; anemia; liver enzyme elevations; and lipid elevations.
Hydroquinone Counseling:  Patient advised that medication may result in skin irritation, lightening (hypopigmentation), dryness, and burning.  In the event of skin irritation, the patient was advised to reduce the amount of the drug applied or use it less frequently.  Rarely, spots that are treated with hydroquinone can become darker (pseudoochronosis).  Should this occur, patient instructed to stop medication and call the office. The patient verbalized understanding of the proper use and possible adverse effects of hydroquinone.  All of the patient's questions and concerns were addressed.
Topical Steroids Applications Pregnancy And Lactation Text: Most topical steroids are considered safe to use during pregnancy and lactation.  Any topical steroid applied to the breast or nipple should be washed off before breastfeeding.
Cyclophosphamide Pregnancy And Lactation Text: This medication is Pregnancy Category D and it isn't considered safe during pregnancy. This medication is excreted in breast milk.
Adbry Counseling: I discussed with the patient the risks of tralokinumab including but not limited to eye infection and irritation, cold sores, injection site reactions, worsening of asthma, allergic reactions and increased risk of parasitic infection.  Live vaccines should be avoided while taking tralokinumab. The patient understands that monitoring is required and they must alert us or the primary physician if symptoms of infection or other concerning signs are noted.
Calcipotriene Pregnancy And Lactation Text: The use of this medication during pregnancy or lactation is not recommended as there is insufficient data.
Qbrexza Counseling:  I discussed with the patient the risks of Qbrexza including but not limited to headache, mydriasis, blurred vision, dry eyes, nasal dryness, dry mouth, dry throat, dry skin, urinary hesitation, and constipation.  Local skin reactions including erythema, burning, stinging, and itching can also occur.
Erythromycin Pregnancy And Lactation Text: This medication is Pregnancy Category B and is considered safe during pregnancy. It is also excreted in breast milk.
Finasteride Female Counseling: Finasteride Counseling:  I discussed with the patient the risks of use of finasteride including but not limited to decreased libido and sexual dysfunction. Explained the teratogenic nature of the medication and stressed the importance of not getting pregnant during treatment. All of the patient's questions and concerns were addressed.
Hydroxychloroquine Counseling:  I discussed with the patient that a baseline ophthalmologic exam is needed at the start of therapy and every year thereafter while on therapy. A CBC may also be warranted for monitoring.  The side effects of this medication were discussed with the patient, including but not limited to agranulocytosis, aplastic anemia, seizures, rashes, retinopathy, and liver toxicity. Patient instructed to call the office should any adverse effect occur.  The patient verbalized understanding of the proper use and possible adverse effects of Plaquenil.  All the patient's questions and concerns were addressed.
Sarecycline Counseling: Patient advised regarding possible photosensitivity and discoloration of the teeth, skin, lips, tongue and gums.  Patient instructed to avoid sunlight, if possible.  When exposed to sunlight, patients should wear protective clothing, sunglasses, and sunscreen.  The patient was instructed to call the office immediately if the following severe adverse effects occur:  hearing changes, easy bruising/bleeding, severe headache, or vision changes.  The patient verbalized understanding of the proper use and possible adverse effects of sarecycline.  All of the patient's questions and concerns were addressed.
Dupixent Pregnancy And Lactation Text: This medication likely crosses the placenta but the risk for the fetus is uncertain. This medication is excreted in breast milk.
Stelara Counseling:  I discussed with the patient the risks of ustekinumab including but not limited to immunosuppression, malignancy, posterior leukoencephalopathy syndrome, and serious infections.  The patient understands that monitoring is required including a PPD at baseline and must alert us or the primary physician if symptoms of infection or other concerning signs are noted.
Olanzapine Pregnancy And Lactation Text: This medication is pregnancy category C.   There are no adequate and well controlled trials with olanzapine in pregnant females.  Olanzapine should be used during pregnancy only if the potential benefit justifies the potential risk to the fetus.   In a study in lactating healthy women, olanzapine was excreted in breast milk.  It is recommended that women taking olanzapine should not breast feed.
Zoryve Counseling:  I discussed with the patient that Zoryve is not for use in the eyes, mouth or vagina. The most commonly reported side effects include diarrhea, headache, insomnia, application site pain, upper respiratory tract infections, and urinary tract infections.  All of the patient's questions and concerns were addressed.
Bactrim Counseling:  I discussed with the patient the risks of sulfa antibiotics including but not limited to GI upset, allergic reaction, drug rash, diarrhea, dizziness, photosensitivity, and yeast infections.  Rarely, more serious reactions can occur including but not limited to aplastic anemia, agranulocytosis, methemoglobinemia, blood dyscrasias, liver or kidney failure, lung infiltrates or desquamative/blistering drug rashes.
Tazorac Counseling:  Patient advised that medication is irritating and drying.  Patient may need to apply sparingly and wash off after an hour before eventually leaving it on overnight.  The patient verbalized understanding of the proper use and possible adverse effects of tazorac.  All of the patient's questions and concerns were addressed.
Ketoconazole Counseling:   Patient counseled regarding improving absorption with orange juice.  Adverse effects include but are not limited to breast enlargement, headache, diarrhea, nausea, upset stomach, liver function test abnormalities, taste disturbance, and stomach pain.  There is a rare possibility of liver failure that can occur when taking ketoconazole. The patient understands that monitoring of LFTs may be required, especially at baseline. The patient verbalized understanding of the proper use and possible adverse effects of ketoconazole.  All of the patient's questions and concerns were addressed.
Enbrel Counseling:  I discussed with the patient the risks of etanercept including but not limited to myelosuppression, immunosuppression, autoimmune hepatitis, demyelinating diseases, lymphoma, and infections.  The patient understands that monitoring is required including a PPD at baseline and must alert us or the primary physician if symptoms of infection or other concerning signs are noted.
Oral Minoxidil Counseling- I discussed with the patient the risks of oral minoxidil including but not limited to shortness of breath, swelling of the feet or ankles, dizziness, lightheadedness, unwanted hair growth and allergic reaction.  The patient verbalized understanding of the proper use and possible adverse effects of oral minoxidil.  All of the patient's questions and concerns were addressed.
Cantharidin Counseling:  I discussed with the patient the risks of Cantharidin including but not limited to pain, redness, burning, itching, and blistering.
Finasteride Pregnancy And Lactation Text: This medication is absolutely contraindicated during pregnancy. It is unknown if it is excreted in breast milk.
Rituxan Counseling:  I discussed with the patient the risks of Rituxan infusions. Side effects can include infusion reactions, severe drug rashes including mucocutaneous reactions, reactivation of latent hepatitis and other infections and rarely progressive multifocal leukoencephalopathy.  All of the patient's questions and concerns were addressed.
Metronidazole Counseling:  I discussed with the patient the risks of metronidazole including but not limited to seizures, nausea/vomiting, a metallic taste in the mouth, nausea/vomiting and severe allergy.
Adbry Pregnancy And Lactation Text: It is unknown if this medication will adversely affect pregnancy or breast feeding.
Acitretin Counseling:  I discussed with the patient the risks of acitretin including but not limited to hair loss, dry lips/skin/eyes, liver damage, hyperlipidemia, depression/suicidal ideation, photosensitivity.  Serious rare side effects can include but are not limited to pancreatitis, pseudotumor cerebri, bony changes, clot formation/stroke/heart attack.  Patient understands that alcohol is contraindicated since it can result in liver toxicity and significantly prolong the elimination of the drug by many years.
Libtayo Counseling- I discussed with the patient the risks of Libtayo including but not limited to nausea, vomiting, diarrhea, and bone or muscle pain.  The patient verbalized understanding of the proper use and possible adverse effects of Libtayo.  All of the patient's questions and concerns were addressed.
Aklief counseling:  Patient advised to apply a pea-sized amount only at bedtime and wait 30 minutes after washing their face before applying.  If too drying, patient may add a non-comedogenic moisturizer.  The most commonly reported side effects including irritation, redness, scaling, dryness, stinging, burning, itching, and increased risk of sunburn.  The patient verbalized understanding of the proper use and possible adverse effects of retinoids.  All of the patient's questions and concerns were addressed.
Qbrexza Pregnancy And Lactation Text: There is no available data on Qbrexza use in pregnant women.  There is no available data on Qbrexza use in lactation.
Cyclosporine Counseling:  I discussed with the patient the risks of cyclosporine including but not limited to hypertension, gingival hyperplasia,myelosuppression, immunosuppression, liver damage, kidney damage, neurotoxicity, lymphoma, and serious infections. The patient understands that monitoring is required including baseline blood pressure, CBC, CMP, lipid panel and uric acid, and then 1-2 times monthly CMP and blood pressure.
Opzelura Counseling:  I discussed with the patient the risks of Opzelura including but not limited to nasopharngitis, bronchitis, ear infection, eosinophila, hives, diarrhea, folliculitis, tonsillitis, and rhinorrhea.  Taken orally, this medication has been linked to serious infections; higher rate of mortality; malignancy and lymphoproliferative disorders; major adverse cardiovascular events; thrombosis; thrombocytopenia, anemia, and neutropenia; and lipid elevations.
Hydroxychloroquine Pregnancy And Lactation Text: This medication has been shown to cause fetal harm but it isn't assigned a Pregnancy Risk Category. There are small amounts excreted in breast milk.
Topical Sulfur Applications Counseling: Topical Sulfur Counseling: Patient counseled that this medication may cause skin irritation or allergic reactions.  In the event of skin irritation, the patient was advised to reduce the amount of the drug applied or use it less frequently.   The patient verbalized understanding of the proper use and possible adverse effects of topical sulfur application.  All of the patient's questions and concerns were addressed.
Cantharidin Pregnancy And Lactation Text: This medication has not been proven safe during pregnancy. It is unknown if this medication is excreted in breast milk.
Litfulo Pregnancy And Lactation Text: Based on animal studies, Lifulo may cause embryo-fetal harm when administered to pregnant women.  The medication should not be used in pregnancy.  Breastfeeding is not recommended during treatment.
Tranexamic Acid Counseling:  Patient advised of the small risk of bleeding problems with tranexamic acid. They were also instructed to call if they developed any nausea, vomiting or diarrhea. All of the patient's questions and concerns were addressed.
Opzelura Pregnancy And Lactation Text: There is insufficient data to evaluate drug-associated risk for major birth defects, miscarriage, or other adverse maternal or fetal outcomes.  There is a pregnancy registry that monitors pregnancy outcomes in pregnant persons exposed to the medication during pregnancy.  It is unknown if this medication is excreted in breast milk.  Do not breastfeed during treatment and for about 4 weeks after the last dose.
Zyclara Counseling:  I discussed with the patient the risks of imiquimod including but not limited to erythema, scaling, itching, weeping, crusting, and pain.  Patient understands that the inflammatory response to imiquimod is variable from person to person and was educated regarded proper titration schedule.  If flu-like symptoms develop, patient knows to discontinue the medication and contact us.
Taltz Counseling: I discussed with the patient the risks of ixekizumab including but not limited to immunosuppression, serious infections, worsening of inflammatory bowel disease and drug reactions.  The patient understands that monitoring is required including a PPD at baseline and must alert us or the primary physician if symptoms of infection or other concerning signs are noted.
Libtayo Pregnancy And Lactation Text: This medication is contraindicated in pregnancy and when breast feeding.
Ketoconazole Pregnancy And Lactation Text: This medication is Pregnancy Category C and it isn't know if it is safe during pregnancy. It is also excreted in breast milk and breast feeding isn't recommended.
Olumiant Counseling: I discussed with the patient the risks of Olumiant therapy including but not limited to upper respiratory tract infections, shingles, cold sores, and nausea. Live vaccines should be avoided.  This medication has been linked to serious infections; higher rate of mortality; malignancy and lymphoproliferative disorders; major adverse cardiovascular events; thrombosis; gastrointestinal perforations; neutropenia; lymphopenia; anemia; liver enzyme elevations; and lipid elevations.
Aklief Pregnancy And Lactation Text: It is unknown if this medication is safe to use during pregnancy.  It is unknown if this medication is excreted in breast milk.  Breastfeeding women should use the topical cream on the smallest area of the skin for the shortest time needed while breastfeeding.  Do not apply to nipple and areola.
Rhofade Counseling: Rhofade is a topical medication which can decrease superficial blood flow where applied. Side effects are uncommon and include stinging, redness and allergic reactions.
Oral Minoxidil Pregnancy And Lactation Text: This medication should only be used when clearly needed if you are pregnant, attempting to become pregnant or breast feeding.
Bactrim Pregnancy And Lactation Text: This medication is Pregnancy Category D and is known to cause fetal risk.  It is also excreted in breast milk.
Tazorac Pregnancy And Lactation Text: This medication is not safe during pregnancy. It is unknown if this medication is excreted in breast milk.
5-Fu Counseling: 5-Fluorouracil Counseling:  I discussed with the patient the risks of 5-fluorouracil including but not limited to erythema, scaling, itching, weeping, crusting, and pain.
Tetracycline Counseling: Patient counseled regarding possible photosensitivity and increased risk for sunburn.  Patient instructed to avoid sunlight, if possible.  When exposed to sunlight, patients should wear protective clothing, sunglasses, and sunscreen.  The patient was instructed to call the office immediately if the following severe adverse effects occur:  hearing changes, easy bruising/bleeding, severe headache, or vision changes.  The patient verbalized understanding of the proper use and possible adverse effects of tetracycline.  All of the patient's questions and concerns were addressed. Patient understands to avoid pregnancy while on therapy due to potential birth defects.
Colchicine Counseling:  Patient counseled regarding adverse effects including but not limited to stomach upset (nausea, vomiting, stomach pain, or diarrhea).  Patient instructed to limit alcohol consumption while taking this medication.  Colchicine may reduce blood counts especially with prolonged use.  The patient understands that monitoring of kidney function and blood counts may be required, especially at baseline. The patient verbalized understanding of the proper use and possible adverse effects of colchicine.  All of the patient's questions and concerns were addressed.
Acitretin Pregnancy And Lactation Text: This medication is Pregnancy Category X and should not be given to women who are pregnant or may become pregnant in the future. This medication is excreted in breast milk.
Hydroxyzine Counseling: Patient advised that the medication is sedating and not to drive a car after taking this medication.  Patient informed of potential adverse effects including but not limited to dry mouth, urinary retention, and blurry vision.  The patient verbalized understanding of the proper use and possible adverse effects of hydroxyzine.  All of the patient's questions and concerns were addressed.
Cephalexin Counseling: I counseled the patient regarding use of cephalexin as an antibiotic for prophylactic and/or therapeutic purposes. Cephalexin (commonly prescribed under brand name Keflex) is a cephalosporin antibiotic which is active against numerous classes of bacteria, including most skin bacteria. Side effects may include nausea, diarrhea, gastrointestinal upset, rash, hives, yeast infections, and in rare cases, hepatitis, kidney disease, seizures, fever, confusion, neurologic symptoms, and others. Patients with severe allergies to penicillin medications are cautioned that there is about a 10% incidence of cross-reactivity with cephalosporins. When possible, patients with penicillin allergies should use alternatives to cephalosporins for antibiotic therapy.
Topical Clindamycin Counseling: Patient counseled that this medication may cause skin irritation or allergic reactions.  In the event of skin irritation, the patient was advised to reduce the amount of the drug applied or use it less frequently.   The patient verbalized understanding of the proper use and possible adverse effects of clindamycin.  All of the patient's questions and concerns were addressed.
Metronidazole Pregnancy And Lactation Text: This medication is Pregnancy Category B and considered safe during pregnancy.  It is also excreted in breast milk.
Bimzelx Counseling:  I discussed with the patient the risks of Bimzelx including but not limited to depression, immunosuppression, allergic reactions and infections.  The patient understands that monitoring is required including a PPD at baseline and must alert us or the primary physician if symptoms of infection or other concerning signs are noted.
Birth Control Pills Counseling: Birth Control Pill Counseling: I discussed with the patient the potential side effects of OCPs including but not limited to increased risk of stroke, heart attack, thrombophlebitis, deep venous thrombosis, hepatic adenomas, breast changes, GI upset, headaches, and depression.  The patient verbalized understanding of the proper use and possible adverse effects of OCPs. All of the patient's questions and concerns were addressed.
Tranexamic Acid Pregnancy And Lactation Text: It is unknown if this medication is safe during pregnancy or breast feeding.
Rituxan Pregnancy And Lactation Text: This medication is Pregnancy Category C and it isn't know if it is safe during pregnancy. It is unknown if this medication is excreted in breast milk but similar antibodies are known to be excreted.
Imiquimod Counseling:  I discussed with the patient the risks of imiquimod including but not limited to erythema, scaling, itching, weeping, crusting, and pain.  Patient understands that the inflammatory response to imiquimod is variable from person to person and was educated regarded proper titration schedule.  If flu-like symptoms develop, patient knows to discontinue the medication and contact us.
Low Dose Naltrexone Counseling- I discussed with the patient the potential risks and side effects of low dose naltrexone including but not limited to: more vivid dreams, headaches, nausea, vomiting, abdominal pain, fatigue, dizziness, and anxiety.
Topical Sulfur Applications Pregnancy And Lactation Text: This medication is Pregnancy Category C and has an unknown safety profile during pregnancy. It is unknown if this topical medication is excreted in breast milk.

## 2024-04-08 NOTE — PROCEDURE: ADDITIONAL NOTES
Detail Level: Simple
Render Risk Assessment In Note?: no
Additional Notes: Pt states she has been using the Opzelura samples we provided - has helped with inflammation.\\nDiscussed with pt that she can try Rogaine (OTC)- topical twice daily or Oral - 1/2 tablet once daily.\\nCan prescribe a topical steroid for inflammation in the affected areas.\\nPt will try topical Rogaine and topical betamethasone at this time.\\nPt to follow up on 3 months.

## 2024-04-08 NOTE — PROCEDURE: PRESCRIPTION MEDICATION MANAGEMENT
Detail Level: Zone
Initiate Treatment: Betamethasone dipropionate 0.05% lotion - Apply to scalp once nightly.
Render In Strict Bullet Format?: No

## 2024-07-08 ENCOUNTER — TELEPHONE ENCOUNTER (OUTPATIENT)
Dept: URBAN - METROPOLITAN AREA CLINIC 74 | Facility: CLINIC | Age: 69
End: 2024-07-08

## 2024-08-07 ENCOUNTER — OFFICE VISIT (OUTPATIENT)
Dept: URBAN - METROPOLITAN AREA CLINIC 74 | Facility: CLINIC | Age: 69
End: 2024-08-07
Payer: MEDICARE

## 2024-08-07 ENCOUNTER — DASHBOARD ENCOUNTERS (OUTPATIENT)
Age: 69
End: 2024-08-07

## 2024-08-07 ENCOUNTER — LAB OUTSIDE AN ENCOUNTER (OUTPATIENT)
Dept: URBAN - METROPOLITAN AREA CLINIC 74 | Facility: CLINIC | Age: 69
End: 2024-08-07

## 2024-08-07 VITALS
HEART RATE: 75 BPM | SYSTOLIC BLOOD PRESSURE: 128 MMHG | BODY MASS INDEX: 27.31 KG/M2 | OXYGEN SATURATION: 98 % | HEIGHT: 64 IN | WEIGHT: 160 LBS | DIASTOLIC BLOOD PRESSURE: 78 MMHG

## 2024-08-07 DIAGNOSIS — R12 HEARTBURN: ICD-10-CM

## 2024-08-07 DIAGNOSIS — K29.60 OTHER GASTRITIS WITHOUT BLEEDING: ICD-10-CM

## 2024-08-07 DIAGNOSIS — K21.9 GASTRO-ESOPHAGEAL REFLUX DISEASE WITHOUT ESOPHAGITIS: ICD-10-CM

## 2024-08-07 PROBLEM — 27970007: Status: ACTIVE | Noted: 2024-08-07

## 2024-08-07 PROBLEM — 40890009: Status: ACTIVE | Noted: 2024-08-07

## 2024-08-07 PROBLEM — 16331000: Status: ACTIVE | Noted: 2024-08-07

## 2024-08-07 PROCEDURE — 99214 OFFICE O/P EST MOD 30 MIN: CPT | Performed by: INTERNAL MEDICINE

## 2024-08-07 RX ORDER — PANTOPRAZOLE SODIUM 40 MG/1
1 TABLET TABLET, DELAYED RELEASE ORAL ONCE A DAY
Qty: 90 | Refills: 3

## 2024-08-07 RX ORDER — FLUOXETINE HYDROCHLORIDE 40 MG/1
1 CAPSULE CAPSULE ORAL ONCE A DAY
Status: ACTIVE | COMMUNITY

## 2024-08-07 RX ORDER — PANTOPRAZOLE SODIUM 40 MG/1
1 TABLET TABLET, DELAYED RELEASE ORAL ONCE A DAY
Status: ACTIVE | COMMUNITY

## 2024-08-07 RX ORDER — ALPRAZOLAM 0.5 MG/1
1 TABLET TABLET ORAL TWICE A DAY
Status: ACTIVE | COMMUNITY

## 2024-08-07 RX ORDER — FLUTICASONE PROPIONATE 50 UG/1
1 SPRAY IN EACH NOSTRIL SPRAY, METERED NASAL ONCE A DAY
Status: ACTIVE | COMMUNITY

## 2024-08-07 NOTE — HPI-TODAY'S VISIT:
This is a 66-year-old female referred by Dr Allen for a GI consultation of colon cancer screening and GERD and a copy of this note was sent to the referring provider.  She had been on Nexium in the past for GERD and was using a CPAP machine at night for sleep apnea but complaining of chronic cough..  Patient also had chronic constipation.  Patient had an EGD on July 5, 2016 that revealed some gastric erythema and erythema of the lower esophagus biopsies were taken.  Patient had a colonoscopy as well on the same day for first-degree relative with history of polyps and constipation and this revealed internal hemorrhoids otherwise normal exam Dr. Hong recommended repeat exam in 5 years.  Biopsy of the stomach showed reactive gastropathy but no H. pylori and esophageal biopsy was normal.  Patient also had a barium swallow on September 20, 2016 that was normal.  CT scan of the abdomen was done on August 9, 2016 which revealed a normal gallbladder, normal liver, normal pancreas. Pt say she goes about every other day in terms of her bowels.Has chronic cough. This year she saw ENT and pulmonary doctors. ENT did a full work up and negative. Pulmonary will f/u on a lung nodule. Pt has severe allergies and is being treated for this. protonix helps GERD. cough has improved a lot after medrol dosepak. not needing tessalon perles  Today June 1, 2023 the patient returns for a follow-up visit, the patient was last seen by Dr. Parish on October 3, 2022 with gastroesophageal reflux, chronic cough, sleep apnea, chronic idiopathic constipation, family history of colon polyps, the patient was to be scheduled for a colon cancer screening.  The patient has been treated with Nexium for GERD and was using the CPAP machine at night for sleep apnea but did complain of chronic cough.  The patient had chronic constipation.  The patient had an EGD on July 5, 2016 which revealed gastric erythema, erythema in the lower esophagus, biopsies were taken.  The patient was found to have reactive gastropathy H. pylori negative and the esophageal biopsies were normal.  The patient had a colonoscopy on the same day which revealed internal hemorrhoids.  At that time the patient was advised to get a colonoscopy in 5 years.  A barium swallow performed September 20, 2016 was normal.  A CT scan of the abdomen performed on August 9, 2016 revealed a normal gallbladder, normal liver, normal pancreas.  The patient had been seen by ENT and pulmonary medicine for chronic cough and the work-up was negative.  The patient had severe allergies and was being treated and along with it was taking Protonix for GERD.  The patient was suspected to have a multifactorial cough possibly allergies and bronchial spasm, in the past neutral Dosepak helped.  The patient was to be scheduled for an EGD and a colonoscopy.  The patient's EGD performed on October 27, 2022 revealed a 1 cm hiatal hernia mild chemical reactive gastropathy H. pylori negative and negative for intestinal metaplasia, squamocolumnar mucosa with reflux type changes.  On the same day the patient had a colonoscopy which revealed a 5 mm transverse sessile polyp which was hyperplastic.  The patient was found to have small nonbleeding internal hemorrhoids and sigmoid, descending colon and transverse colon diverticulosis with no evidence of diverticulitis or bleeding.  Today the patient returns to the office to get results from the previous colonoscopy and EGD performed by Dr. Parish.  The patient was made aware that on the EGD she was found to have a hiatal hernia, mild chemical reactive gastropathy H. pylori negative and negative for intestinal metaplasia, the esophagus showed reflux type changes with no evidence of Rojas's.  The colonoscopy revealed a 5 mm transverse sessile hyperplastic polyp, small nonbleeding internal hemorrhoids and diverticulosis throughout the sigmoid descending and transverse colon.  The patient states that as long as she follows antireflux measures and diet and takes pantoprazole 40 mg daily she has no acid reflux.  Otherwise she becomes symptomatic.  The patient denies having any dysphagia, odynophagia, there was no nausea or vomiting.  There was no evidence of GI bleeding.  Bowel movements remain type IV on the Leslie scale, there is no blood in the stool.  The patient was advised to follow antireflux measures and diet, continue to take pantoprazole 40 mg daily.  The patient's next colonoscopy will be due in 2027 in view of the family history of colonic polyps, she will remain on a high-fiber diet.  The patient inquired about her 2016 liver testing where she was found to have either a liver cyst or hemangioma, there has been no follow-up appointment, the patient will be scheduled to have an ultrasound of the right upper quadrant, she will be contacted with reports and recommendations.  As long as doing well the patient will return for follow-up visit in 1 year or as needed.  Today November 6, 2023 the patient returns for a follow-up visit, the patient was last seen on June 1, 2023 with GERD without esophagitis, chronic gastritis without bleeding, family history of colon polyps, colonic diverticulosis without diverticulitis, a hyperplastic transverse colon polyp, sleep apnea, chronic cough, liver lesion, the patient was overweight.  At the time of the last visit the patient returns after having an EGD and a colonoscopy performed by Dr. Calloway, on the EGD she was found to have a hiatal hernia, mild chemical reactive gastropathy H. pylori negative, negative for intestinal metaplasia, the esophagus showed reflux type changes with no evidence of Rojas's. The colonoscopy revealed a 5 mm transverse sessile hyperplastic polyp, small nonbleeding internal hemorrhoids and diverticulosis throughout the sigmoid descending and transverse colon. The patient stated that as long as she followed  antireflux measures and diet and was taking pantoprazole 40 mg daily she had no acid reflux otherwise she became symptomatic. The patient denies having any dysphagia, odynophagia, there was no nausea or vomiting.  The patient denies having any GI bleeding.  The patient's bowel movements remain type IV on the Leslie scale, there was no blood in the stool. The patient at the time was advised to follow antireflux measures and diet, continue taking pantoprazole 40 mg daily.  The patient was advised to get a colonoscopy in 2027.  The patient did have family history of colon polyps.  The patient was advised to follow a high-fiber diet. The patient inquired about the 2016 liver testing where she was found to have a either a liver cyst or hemangioma, there had been no follow-up since.  The patient was scheduled to have a right upper quadrant ultrasound.  The ultrasound was performed on June 20, 2023 and it revealed a simple cyst in the left lobe of the liver measuring 1.2 x 1.0 x 0.8 cm, the patient had a normal common bile duct measuring 2 mm and no gallstones.  The right kidney was normal. A CT of the abdomen and pelvis performed on July 13, 2023 revealed a normal-looking liver, normal gallbladder, pancreas, spleen, adrenals.  The patient had moderate right-sided hydroureteronephrosis secondary to a 0.5 cm stone in the distal right ureter measuring 440 Hounsfield units, there were also significant infiltrative changes around the right kidney, there were additional nonobstructing stones in the right upper and mid kidney the patient's the patient's stomach, small bowel and colon appeared to be normal, the appendix was not identified, there was no inflammatory change.  The patient did have a posterior fusion of L4 and L5 with disc prostheses and postoperative changes as well as degenerative disc disease of L2-L3 and L3-L4.  Today the patient returns to the office stating that she is doing reasonably well, she had been seen by her OB/GYN physician for some left inguinal discomfort, currently she is having normal bowel movements, denies having any diarrhea, constipation, rectal bleeding or hematochezia.  She claims that as the day goes on she developed some left inguinal discomfort.The patient denied having any upper GI symptoms such as dysphagia, odynophagia, nausea, vomiting, heartburn. I discussed with the patient the 2016 liver ultrasound which revealed a 7 mm hemangioma, subsequently discussed with the patient the ultrasound performed in June 2023 which now revealed a 1.2 x 1.0 x 0.8 cm simple cyst with reverberation artifact in the left lobe of the liver, normal, bile duct and gallbladder, normal liver otherwise and the CT scan performed in July 2023 for a kidney stone that showed a normal liver.  The patient is very concerned about her liver, the patient agreed to get an MRI with and without contrast to clarify the type of lesion she does or not have and an alpha-fetoprotein.  We will contact the patient with reports of recommendations.  The patient will return for a follow-up visit in 6 months or as needed.  Today February 7, 2024 the patient returns for a follow-up visit, the patient was last seen on November 6, 2023 with GERD without esophagitis, chronic gastritis without bleeding, chronic cough, colonic diverticulosis without diverticulitis, history of a hyperplastic transverse colon polyp, family history of colon polyps, sleep apnea, the patient was overweight and had a liver lesion as well as kidney stones.  At the time of the last visit the patient appeared to be doing reasonably well, the patient had been seen by her OB/GYN physician for left inguinal discomfort. The patient was having normal bowel movements, denied having diarrhea or constipation, there was no rectal bleeding or hematochezia.  The patient stated that that inguinal discomfort appeared during the day as the day went on it got better.  The patient denied having any upper GI symptoms such as dysphagia, odynophagia, nausea vomiting or heartburn. I discussed with the patient 2016 liver ultrasound which revealed a 7 mm hemangioma, we also discussed the ultrasound performed June 2023 which revealed a 1.2 x 1.0 x 0.8 cm simple cyst in the left lobe of the liver, normal bile ducts, normal gallbladder. A CT scan performed July 2023 for a kidney stone showed a normal liver. The patient was extremely concerned about her liver condition and agreed to get an MRI with and without contrast. On November 6, 2023 the patient had a low alpha-fetoprotein of 1.0 The MRI performed on November 21, 2023 revealed a 1 cm cyst in the medial segment, a small nonenhancing structure of uncertain etiology difficult to localize on the coronal sequences but it did not appear to be a worrisome lesion, the gallbladder and bile ducts appeared to be normal, the pancreas, spleen, adrenals, kidneys were unremarkable there was no lymphadenopathy. In the root of the small bowel mesentery abutting the superior mesenteric artery there is an 11 mm cyst, it also abuts the third portion of the duodenum but there was no definite enhancement, the abdominal wall appeared to be normal, there was postoperative change from lumbar spinal surgery with small amount of fluid in the posterior soft tissues similar to previous CT. The radiologist recommended follow-up of the small cyst in the root of the small bowel mesentery since it was not seen on a CT scan of May 2016.  Today the patient returns to the office stating that she is doing reasonably well, denied having any abdominal pain, did complain of increasing gastroesophageal reflux and cough, mostly at night, the patient had stopped taking the pantoprazole and was not compliant with antireflux measures and diet.The patient has been advised to follow antireflux measures and diet and resume pantoprazole 40 mg daily.  The patient does have an ENT appointment scheduled. The patient is currently having normal bowel movements, there has been no evidence of GI bleeding. 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 The patient had a follow-up of CT scan performed on January 29, 2024 which again revealed a normal stomach, small bowel and colon besides a very redundant ascending colon crossing the midline with the cecum projecting in the left upper quadrant.  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  There was no lymphadenopathy, the liver otherwise gallbladder, pancreas spleen and adrenals appear to be normal the patient had bilateral nonobstructive nephrolithiasis. After discussing with the patient at length the previous findings we agreed to obtain a follow-up CT scan in 6 months, if the lesion remains unchanged he will then be followed in 12 to 24 months. The patient will return for a follow-up visit after her follow-up CT scan.  Today August 7, 2024 the patient returns for follow-up visit, the patient was last seen on February 7, 2024 with GERD without esophagitis, chronic gastritis without bleeding, family history of colon polyps, colonic diverticulosis without diverticulitis, personal history of a hyperplastic polyp in the transverse colon, liver disease, the patient was overweight, mesenteric cyst, chronic cough and sleep apnea.  At the time of the visit the patient denied having any abdominal pain, did complain of increasing gastroesophageal reflux and cough, it occurred mostly during the night, the patient stopped taking pantoprazole and was noncompliant on antireflux measures and diet.  At this time the patient was advised to follow antireflux measures and diet, resume pantoprazole 40 mg daily.  During the visit the patient states she was having normal bowel movements, there was no evidence of GI bleeding.  We discussed the previous MRI performed November 21, 2023 which revealed again a 1 cm hepatic and a new 8 mm millimeter mesenteric cyst located in the root of the small bowel mesentery abutting the superior mesenteric artery and the duodenum.  A follow-up CT scan performed January 29, 2024 revealed a normal stomach, normal small bowel and colon besides a very redundant ascending colon crossing the midline with the cecum projecting in the left upper quadrant.  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  There was no lymphadenopathy, liver, gallbladder, pancreas and spleen and adrenals were normal, there was bilateral nonobstructive nephrolithiasis.  At the time the patient agreed to return in 6 months with a follow-up CT scan, if unchanged then patient would then be advised to follow-up on the previously mentioned lesions in 12 to 24 months.  The CT scan performed July 5, 2024 revealed a 9 mm fluid density mass with in the medial left hepatic lobe adjacent to the falciform ligament unchanged when compared to the previous exam consistent with a cyst, normal biliary ducts and gallbladder, normal spleen.  There was mild local distention of the pancreatic duct in the pancreatic head measuring up to 5 mm similar appearance on a CT scan from July 13, 2023, the pancreatic duct appeared normal on the comparison MRI in 2023 possibly the change in appearance may be related to volume averaging with adjacent fat.  The patient had bilateral renal calculi, the stomach, small bowel and appendix were normal, the patient had a hypodense mass adjacent to the superior mesenteric artery overlying the third portion of the duodenum measuring 1.5 x 1.0 cm, on comparison on CTA measured 1.2 x 1.0 cm on CT from 7/13/2023 1.3 x 0.9, measuring 27 Hounsfield units slightly more dense than simple fluid although density measurement is limited by streak artifact related to lumbar spine hardware.  The patient had extensive spinal changes including a lumbar interbody fusion of L5-S1.  Radiology recommended the patient get a follow-up contrast-enhanced CT of this particular lesion in 3 to 6 months.  Today the patient returns to the office stating that after taking over-the-counter ibuprofen for hand pain she developed intense gastroesophageal reflux and heartburn but lasted for several days, that did not change while taking pantoprazole 40 mg daily.  The patient has also noticed that for the past 2 months she has had intermittent episodes of mid esophageal dysphagia to solids but not liquids.  Currently the heartburn has gradually gotten better.  There has been no nausea, vomiting, hematemesis or melena.  Bowel movements remain type IV on the Leslie scale with no blood.  I discussed at length the CT findings and the patient was made aware that she had a hypodense mass in the root of the mesentery slightly larger than on the previous CT, now measuring 15 x 10 mm previously 13 x 10.  The radiologist recommended a follow-up CT with contrast in 3 to 6 months, the patient chose 6 months.  The patient also was found to have an L4-L5 anterior and posterior lumbar interbody fusion with an interval and compression fracture of the L5 vertebral body with 25% loss of vertebral body height and mild lucency adjacent to the left L5 pedicle suggestive of hardware loosening.  .  The patient was made aware he should be seen by her orthopedic surgeon.  The patient is being scheduled to have an EGD, benefits potential complications and alternatives have been disclosed, she will follow antireflux measures and diet and remain on pantoprazole 40 mg daily.  The patient's next colonoscopy will be due in 2027.

## 2024-08-09 PROBLEM — 235595009: Status: ACTIVE | Noted: 2024-08-09

## 2024-09-17 ENCOUNTER — CLAIMS CREATED FROM THE CLAIM WINDOW (OUTPATIENT)
Dept: URBAN - METROPOLITAN AREA CLINIC 4 | Facility: CLINIC | Age: 69
End: 2024-09-17
Payer: MEDICARE

## 2024-09-17 ENCOUNTER — CLAIMS CREATED FROM THE CLAIM WINDOW (OUTPATIENT)
Dept: URBAN - METROPOLITAN AREA SURGERY CENTER 30 | Facility: SURGERY CENTER | Age: 69
End: 2024-09-17
Payer: MEDICARE

## 2024-09-17 ENCOUNTER — OFFICE VISIT (OUTPATIENT)
Dept: URBAN - METROPOLITAN AREA SURGERY CENTER 30 | Facility: SURGERY CENTER | Age: 69
End: 2024-09-17

## 2024-09-17 DIAGNOSIS — K25.9 GASTRIC ULCER: ICD-10-CM

## 2024-09-17 DIAGNOSIS — K29.70 GASTRITIS WITHOUT BLEEDING, UNSPECIFIED CHRONICITY, UNSPECIFIED GASTRITIS TYPE: ICD-10-CM

## 2024-09-17 DIAGNOSIS — K25.7 CHRONIC GASTRIC ULCER WITHOUT HEMORRHAGE OR PERFORATION: ICD-10-CM

## 2024-09-17 DIAGNOSIS — K31.89 OTHER DISEASES OF STOMACH AND DUODENUM: ICD-10-CM

## 2024-09-17 DIAGNOSIS — K21.9 GASTRO-ESOPHAGEAL REFLUX DISEASE WITHOUT ESOPHAGITIS: ICD-10-CM

## 2024-09-17 PROCEDURE — 88342 IMHCHEM/IMCYTCHM 1ST ANTB: CPT | Performed by: PATHOLOGY

## 2024-09-17 PROCEDURE — 43239 EGD BIOPSY SINGLE/MULTIPLE: CPT | Performed by: INTERNAL MEDICINE

## 2024-09-17 PROCEDURE — 88305 TISSUE EXAM BY PATHOLOGIST: CPT | Performed by: PATHOLOGY

## 2024-09-17 PROCEDURE — 88341 IMHCHEM/IMCYTCHM EA ADD ANTB: CPT | Performed by: PATHOLOGY

## 2024-09-17 PROCEDURE — 00731 ANES UPR GI NDSC PX NOS: CPT | Performed by: NURSE ANESTHETIST, CERTIFIED REGISTERED

## 2024-09-17 RX ORDER — PANTOPRAZOLE SODIUM 40 MG/1
1 TABLET TABLET, DELAYED RELEASE ORAL ONCE A DAY
Qty: 90 | Refills: 3 | Status: ACTIVE | COMMUNITY

## 2024-09-17 RX ORDER — FLUTICASONE PROPIONATE 50 UG/1
1 SPRAY IN EACH NOSTRIL SPRAY, METERED NASAL ONCE A DAY
Status: ACTIVE | COMMUNITY

## 2024-09-17 RX ORDER — ALPRAZOLAM 0.5 MG/1
1 TABLET TABLET ORAL TWICE A DAY
Status: ACTIVE | COMMUNITY

## 2024-09-17 RX ORDER — FLUOXETINE HYDROCHLORIDE 40 MG/1
1 CAPSULE CAPSULE ORAL ONCE A DAY
Status: ACTIVE | COMMUNITY

## 2024-10-07 PROBLEM — 37693008: Status: ACTIVE | Noted: 2024-10-07

## 2024-10-07 PROBLEM — 67964002: Status: ACTIVE | Noted: 2024-10-07

## 2024-11-08 ENCOUNTER — OFFICE VISIT (OUTPATIENT)
Dept: URBAN - METROPOLITAN AREA CLINIC 74 | Facility: CLINIC | Age: 69
End: 2024-11-08
Payer: MEDICARE

## 2024-11-08 ENCOUNTER — LAB OUTSIDE AN ENCOUNTER (OUTPATIENT)
Dept: URBAN - METROPOLITAN AREA CLINIC 74 | Facility: CLINIC | Age: 69
End: 2024-11-08

## 2024-11-08 VITALS
SYSTOLIC BLOOD PRESSURE: 122 MMHG | BODY MASS INDEX: 26.98 KG/M2 | OXYGEN SATURATION: 97 % | DIASTOLIC BLOOD PRESSURE: 70 MMHG | HEIGHT: 64 IN | WEIGHT: 158 LBS | HEART RATE: 90 BPM

## 2024-11-08 DIAGNOSIS — K29.50 CHRONIC GASTRITIS WITHOUT BLEEDING, UNSPECIFIED GASTRITIS TYPE: ICD-10-CM

## 2024-11-08 DIAGNOSIS — R13.19 ESOPHAGEAL DYSPHAGIA: ICD-10-CM

## 2024-11-08 DIAGNOSIS — K21.9 GERD WITHOUT ESOPHAGITIS: ICD-10-CM

## 2024-11-08 DIAGNOSIS — K63.5 HYPERPLASTIC POLYP OF TRANSVERSE COLON: ICD-10-CM

## 2024-11-08 DIAGNOSIS — K57.30 DIVERTICULOSIS OF COLON WITHOUT DIVERTICULITIS: ICD-10-CM

## 2024-11-08 DIAGNOSIS — R05.3 CHRONIC COUGH: ICD-10-CM

## 2024-11-08 DIAGNOSIS — K76.89 LIVER CYST: ICD-10-CM

## 2024-11-08 DIAGNOSIS — G47.30 SLEEP APNEA, UNSPECIFIED TYPE: ICD-10-CM

## 2024-11-08 DIAGNOSIS — K29.60 CHEMICAL GASTRITIS: ICD-10-CM

## 2024-11-08 DIAGNOSIS — K66.8 MESENTERIC CYST: ICD-10-CM

## 2024-11-08 DIAGNOSIS — Z83.71 FAMILY HISTORY OF COLONIC POLYPS: ICD-10-CM

## 2024-11-08 DIAGNOSIS — E66.3 OVERWEIGHT (BMI 25.0-29.9): ICD-10-CM

## 2024-11-08 DIAGNOSIS — R12 HEARTBURN: ICD-10-CM

## 2024-11-08 DIAGNOSIS — K25.3 ACUTE GASTRIC ULCER WITHOUT HEMORRHAGE OR PERFORATION: ICD-10-CM

## 2024-11-08 PROCEDURE — 99213 OFFICE O/P EST LOW 20 MIN: CPT | Performed by: INTERNAL MEDICINE

## 2024-11-08 RX ORDER — FLUOXETINE HYDROCHLORIDE 40 MG/1
1 CAPSULE CAPSULE ORAL ONCE A DAY
Status: ACTIVE | COMMUNITY

## 2024-11-08 RX ORDER — PANTOPRAZOLE SODIUM 40 MG/1
1 TABLET TABLET, DELAYED RELEASE ORAL ONCE A DAY
Qty: 90 | Refills: 3 | Status: ACTIVE | COMMUNITY

## 2024-11-08 RX ORDER — FLUTICASONE PROPIONATE 50 UG/1
1 SPRAY IN EACH NOSTRIL SPRAY, METERED NASAL ONCE A DAY
Status: ACTIVE | COMMUNITY

## 2024-11-08 RX ORDER — PANTOPRAZOLE SODIUM 40 MG/1
1 TABLET TABLET, DELAYED RELEASE ORAL ONCE A DAY
OUTPATIENT

## 2024-11-08 RX ORDER — ALPRAZOLAM 0.5 MG/1
1 TABLET TABLET ORAL TWICE A DAY
Status: ACTIVE | COMMUNITY

## 2024-11-08 NOTE — HPI-TODAY'S VISIT:
This is a 66-year-old female referred by Dr Allen for a GI consultation of colon cancer screening and GERD and a copy of this note was sent to the referring provider.  She had been on Nexium in the past for GERD and was using a CPAP machine at night for sleep apnea but complaining of chronic cough..  Patient also had chronic constipation.  Patient had an EGD on July 5, 2016 that revealed some gastric erythema and erythema of the lower esophagus biopsies were taken.  Patient had a colonoscopy as well on the same day for first-degree relative with history of polyps and constipation and this revealed internal hemorrhoids otherwise normal exam Dr. Hong recommended repeat exam in 5 years.  Biopsy of the stomach showed reactive gastropathy but no H. pylori and esophageal biopsy was normal.  Patient also had a barium swallow on September 20, 2016 that was normal.  CT scan of the abdomen was done on August 9, 2016 which revealed a normal gallbladder, normal liver, normal pancreas. Pt say she goes about every other day in terms of her bowels.Has chronic cough. This year she saw ENT and pulmonary doctors. ENT did a full work up and negative. Pulmonary will f/u on a lung nodule. Pt has severe allergies and is being treated for this. protonix helps GERD. cough has improved a lot after medrol dosepak. not needing tessalon perles  Today June 1, 2023 the patient returns for a follow-up visit, the patient was last seen by Dr. Parish on October 3, 2022 with gastroesophageal reflux, chronic cough, sleep apnea, chronic idiopathic constipation, family history of colon polyps, the patient was to be scheduled for a colon cancer screening.  The patient has been treated with Nexium for GERD and was using the CPAP machine at night for sleep apnea but did complain of chronic cough.  The patient had chronic constipation.  The patient had an EGD on July 5, 2016 which revealed gastric erythema, erythema in the lower esophagus, biopsies were taken.  The patient was found to have reactive gastropathy H. pylori negative and the esophageal biopsies were normal.  The patient had a colonoscopy on the same day which revealed internal hemorrhoids.  At that time the patient was advised to get a colonoscopy in 5 years.  A barium swallow performed September 20, 2016 was normal.  A CT scan of the abdomen performed on August 9, 2016 revealed a normal gallbladder, normal liver, normal pancreas.  The patient had been seen by ENT and pulmonary medicine for chronic cough and the work-up was negative.  The patient had severe allergies and was being treated and along with it was taking Protonix for GERD.  The patient was suspected to have a multifactorial cough possibly allergies and bronchial spasm, in the past neutral Dosepak helped.  The patient was to be scheduled for an EGD and a colonoscopy.  The patient's EGD performed on October 27, 2022 revealed a 1 cm hiatal hernia mild chemical reactive gastropathy H. pylori negative and negative for intestinal metaplasia, squamocolumnar mucosa with reflux type changes.  On the same day the patient had a colonoscopy which revealed a 5 mm transverse sessile polyp which was hyperplastic.  The patient was found to have small nonbleeding internal hemorrhoids and sigmoid, descending colon and transverse colon diverticulosis with no evidence of diverticulitis or bleeding.  Today the patient returns to the office to get results from the previous colonoscopy and EGD performed by Dr. Parish.  The patient was made aware that on the EGD she was found to have a hiatal hernia, mild chemical reactive gastropathy H. pylori negative and negative for intestinal metaplasia, the esophagus showed reflux type changes with no evidence of Rojas's.  The colonoscopy revealed a 5 mm transverse sessile hyperplastic polyp, small nonbleeding internal hemorrhoids and diverticulosis throughout the sigmoid descending and transverse colon.  The patient states that as long as she follows antireflux measures and diet and takes pantoprazole 40 mg daily she has no acid reflux.  Otherwise she becomes symptomatic.  The patient denies having any dysphagia, odynophagia, there was no nausea or vomiting.  There was no evidence of GI bleeding.  Bowel movements remain type IV on the [place] scale, there is no blood in the stool.  The patient was advised to follow antireflux measures and diet, continue to take pantoprazole 40 mg daily.  The patient's next colonoscopy will be due in 2027 in view of the family history of colonic polyps, she will remain on a high-fiber diet.  The patient inquired about her 2016 liver testing where she was found to have either a liver cyst or hemangioma, there has been no follow-up appointment, the patient will be scheduled to have an ultrasound of the right upper quadrant, she will be contacted with reports and recommendations.  As long as doing well the patient will return for follow-up visit in 1 year or as needed.  Today November 6, 2023 the patient returns for a follow-up visit, the patient was last seen on June 1, 2023 with GERD without esophagitis, chronic gastritis without bleeding, family history of colon polyps, colonic diverticulosis without diverticulitis, a hyperplastic transverse colon polyp, sleep apnea, chronic cough, liver lesion, the patient was overweight.  At the time of the last visit the patient returns after having an EGD and a colonoscopy performed by Dr. Calloway, on the EGD she was found to have a hiatal hernia, mild chemical reactive gastropathy H. pylori negative, negative for intestinal metaplasia, the esophagus showed reflux type changes with no evidence of Rojas's. The colonoscopy revealed a 5 mm transverse sessile hyperplastic polyp, small nonbleeding internal hemorrhoids and diverticulosis throughout the sigmoid descending and transverse colon. The patient stated that as long as she followed  antireflux measures and diet and was taking pantoprazole 40 mg daily she had no acid reflux otherwise she became symptomatic. The patient denies having any dysphagia, odynophagia, there was no nausea or vomiting.  The patient denies having any GI bleeding.  The patient's bowel movements remain type IV on the [place] scale, there was no blood in the stool. The patient at the time was advised to follow antireflux measures and diet, continue taking pantoprazole 40 mg daily.  The patient was advised to get a colonoscopy in 2027.  The patient did have family history of colon polyps.  The patient was advised to follow a high-fiber diet. The patient inquired about the 2016 liver testing where she was found to have a either a liver cyst or hemangioma, there had been no follow-up since.  The patient was scheduled to have a right upper quadrant ultrasound.  The ultrasound was performed on June 20, 2023 and it revealed a simple cyst in the left lobe of the liver measuring 1.2 x 1.0 x 0.8 cm, the patient had a normal common bile duct measuring 2 mm and no gallstones.  The right kidney was normal. A CT of the abdomen and pelvis performed on July 13, 2023 revealed a normal-looking liver, normal gallbladder, pancreas, spleen, adrenals.  The patient had moderate right-sided hydroureteronephrosis secondary to a 0.5 cm stone in the distal right ureter measuring 440 Hounsfield units, there were also significant infiltrative changes around the right kidney, there were additional nonobstructing stones in the right upper and mid kidney the patient's the patient's stomach, small bowel and colon appeared to be normal, the appendix was not identified, there was no inflammatory change.  The patient did have a posterior fusion of L4 and L5 with disc prostheses and postoperative changes as well as degenerative disc disease of L2-L3 and L3-L4.  Today the patient returns to the office stating that she is doing reasonably well, she had been seen by her OB/GYN physician for some left inguinal discomfort, currently she is having normal bowel movements, denies having any diarrhea, constipation, rectal bleeding or hematochezia.  She claims that as the day goes on she developed some left inguinal discomfort.The patient denied having any upper GI symptoms such as dysphagia, odynophagia, nausea, vomiting, heartburn. I discussed with the patient the 2016 liver ultrasound which revealed a 7 mm hemangioma, subsequently discussed with the patient the ultrasound performed in June 2023 which now revealed a 1.2 x 1.0 x 0.8 cm simple cyst with reverberation artifact in the left lobe of the liver, normal, bile duct and gallbladder, normal liver otherwise and the CT scan performed in July 2023 for a kidney stone that showed a normal liver.  The patient is very concerned about her liver, the patient agreed to get an MRI with and without contrast to clarify the type of lesion she does or not have and an alpha-fetoprotein.  We will contact the patient with reports of recommendations.  The patient will return for a follow-up visit in 6 months or as needed.  Today February 7, 2024 the patient returns for a follow-up visit, the patient was last seen on November 6, 2023 with GERD without esophagitis, chronic gastritis without bleeding, chronic cough, colonic diverticulosis without diverticulitis, history of a hyperplastic transverse colon polyp, family history of colon polyps, sleep apnea, the patient was overweight and had a liver lesion as well as kidney stones.  At the time of the last visit the patient appeared to be doing reasonably well, the patient had been seen by her OB/GYN physician for left inguinal discomfort. The patient was having normal bowel movements, denied having diarrhea or constipation, there was no rectal bleeding or hematochezia.  The patient stated that that inguinal discomfort appeared during the day as the day went on it got better.  The patient denied having any upper GI symptoms such as dysphagia, odynophagia, nausea vomiting or heartburn. I discussed with the patient 2016 liver ultrasound which revealed a 7 mm hemangioma, we also discussed the ultrasound performed June 2023 which revealed a 1.2 x 1.0 x 0.8 cm simple cyst in the left lobe of the liver, normal bile ducts, normal gallbladder. A CT scan performed July 2023 for a kidney stone showed a normal liver. The patient was extremely concerned about her liver condition and agreed to get an MRI with and without contrast. On November 6, 2023 the patient had a low alpha-fetoprotein of 1.0 The MRI performed on November 21, 2023 revealed a 1 cm cyst in the medial segment, a small nonenhancing structure of uncertain etiology difficult to localize on the coronal sequences but it did not appear to be a worrisome lesion, the gallbladder and bile ducts appeared to be normal, the pancreas, spleen, adrenals, kidneys were unremarkable there was no lymphadenopathy. In the root of the small bowel mesentery abutting the superior mesenteric artery there is an 11 mm cyst, it also abuts the third portion of the duodenum but there was no definite enhancement, the abdominal wall appeared to be normal, there was postoperative change from lumbar spinal surgery with small amount of fluid in the posterior soft tissues similar to previous CT. The radiologist recommended follow-up of the small cyst in the root of the small bowel mesentery since it was not seen on a CT scan of May 2016.  Today the patient returns to the office stating that she is doing reasonably well, denied having any abdominal pain, did complain of increasing gastroesophageal reflux and cough, mostly at night, the patient had stopped taking the pantoprazole and was not compliant with antireflux measures and diet.The patient has been advised to follow antireflux measures and diet and resume pantoprazole 40 mg daily.  The patient does have an ENT appointment scheduled. The patient is currently having normal bowel movements, there has been no evidence of GI bleeding. 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 The patient had a follow-up of CT scan performed on January 29, 2024 which again revealed a normal stomach, small bowel and colon besides a very redundant ascending colon crossing the midline with the cecum projecting in the left upper quadrant.  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  There was no lymphadenopathy, the liver otherwise gallbladder, pancreas spleen and adrenals appear to be normal the patient had bilateral nonobstructive nephrolithiasis. After discussing with the patient at length the previous findings we agreed to obtain a follow-up CT scan in 6 months, if the lesion remains unchanged he will then be followed in 12 to 24 months. The patient will return for a follow-up visit after her follow-up CT scan.  Today August 7, 2024 the patient returns for follow-up visit, the patient was last seen on February 7, 2024 with GERD without esophagitis, chronic gastritis without bleeding, family history of colon polyps, colonic diverticulosis without diverticulitis, personal history of a hyperplastic polyp in the transverse colon, liver disease, the patient was overweight, mesenteric cyst, chronic cough and sleep apnea.  At the time of the visit the patient denied having any abdominal pain, did complain of increasing gastroesophageal reflux and cough, it occurred mostly during the night, the patient stopped taking pantoprazole and was noncompliant on antireflux measures and diet.  At this time the patient was advised to follow antireflux measures and diet, resume pantoprazole 40 mg daily.  During the visit the patient states she was having normal bowel movements, there was no evidence of GI bleeding.  We discussed the previous MRI performed November 21, 2023 which revealed again a 1 cm hepatic and a new 8 mm millimeter mesenteric cyst located in the root of the small bowel mesentery abutting the superior mesenteric artery and the duodenum.  A follow-up CT scan performed January 29, 2024 revealed a normal stomach, normal small bowel and colon besides a very redundant ascending colon crossing the midline with the cecum projecting in the left upper quadrant.  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  There was no lymphadenopathy, liver, gallbladder, pancreas and spleen and adrenals were normal, there was bilateral nonobstructive nephrolithiasis.  At the time the patient agreed to return in 6 months with a follow-up CT scan, if unchanged then patient would then be advised to follow-up on the previously mentioned lesions in 12 to 24 months.  The CT scan performed July 5, 2024 revealed a 9 mm fluid density mass with in the medial left hepatic lobe adjacent to the falciform ligament unchanged when compared to the previous exam consistent with a cyst, normal biliary ducts and gallbladder, normal spleen.  There was mild local distention of the pancreatic duct in the pancreatic head measuring up to 5 mm similar appearance on a CT scan from July 13, 2023, the pancreatic duct appeared normal on the comparison MRI in 2023 possibly the change in appearance may be related to volume averaging with adjacent fat.  The patient had bilateral renal calculi, the stomach, small bowel and appendix were normal, the patient had a hypodense mass adjacent to the superior mesenteric artery overlying the third portion of the duodenum measuring 1.5 x 1.0 cm, on comparison on CTA measured 1.2 x 1.0 cm on CT from 7/13/2023 1.3 x 0.9, measuring 27 Hounsfield units slightly more dense than simple fluid although density measurement is limited by streak artifact related to lumbar spine hardware.  The patient had extensive spinal changes including a lumbar interbody fusion of L5-S1.  Radiology recommended the patient get a follow-up contrast-enhanced CT of this particular lesion in 3 to 6 months.  Today the patient returns to the office stating that after taking over-the-counter ibuprofen for hand pain she developed intense gastroesophageal reflux and heartburn but lasted for several days, that did not change while taking pantoprazole 40 mg daily.  The patient has also noticed that for the past 2 months she has had intermittent episodes of mid esophageal dysphagia to solids but not liquids.  Currently the heartburn has gradually gotten better.  There has been no nausea, vomiting, hematemesis or melena.  Bowel movements remain type IV on the [place] scale with no blood.  I discussed at length the CT findings and the patient was made aware that she had a hypodense mass in the root of the mesentery slightly larger than on the previous CT, now measuring 15 x 10 mm previously 13 x 10.  The radiologist recommended a follow-up CT with contrast in 3 to 6 months, the patient chose 6 months.  The patient also was found to have an L4-L5 anterior and posterior lumbar interbody fusion with an interval and compression fracture of the L5 vertebral body with 25% loss of vertebral body height and mild lucency adjacent to the left L5 pedicle suggestive of hardware loosening.  .  The patient was made aware he should be seen by her orthopedic surgeon.  The patient is being scheduled to have an EGD, benefits potential complications and alternatives have been disclosed, she will follow antireflux measures and diet and remain on pantoprazole 40 mg daily.  The patient's next colonoscopy will be due in 2027.  Today November 8, 2024 the patient returns for a follow-up visit, the patient was last seen on August 7, 2024 with GERD without esophagitis, heartburn, esophageal dysphagia, chronic gastritis, colonic diverticulosis, history of hyperplastic colonic polyps, family history of colon polyps, sleep apnea, chronic cough, the patient was overweight, had a liver cyst and a mesenteric cyst.  At the time of the visit the patient complained of intense gastroesophageal reflux and heartburn which started after taking ibuprofen for hand pain, the heartburn did not improve on pantoprazole 40 mg daily.  The patient did have for a period of 2 months intermittent episodes of mid esophageal dysphagia to solids but not liquids.  The patient denied having nausea or vomiting and there was no evidence of GI bleeding.  Bowel movements remained type IV on the [place] scale with no blood.    At the time we discussed the recent CT of the abdomen and we made aware the patient that she had a hypodense mass in the root of the mesentery slightly larger than on previous CT scan, currently measuring 15 x 10 mm previously 13 x 10 mm.  Radiologist recommended follow-up within 3 to 6 months and the patient decided to get the follow-up in 6 months.  The patient also was found to have a L4-L5 interbody fusion, interval compression of fracture of L5 body with 25% loss of vertebral body height and changes suggestive of hardware loosening.  The patient was advised to be seen by orthopedic surgery.  The patient was scheduled to have an EGD and was advised to follow antireflux measures and diet and continue taking pantoprazole 40 mg daily.  The EGD was performed on September 17, 2024, the EGD revealed a variable Z-line, a linear gastric ulcer in the gastric antrum measuring 4 mm in the largest dimension, antral erythema and normal duodenal mucosa.  Biopsies revealed foveolar hyperplasia consistent with ulcer border negative for H. pylori dysplasia or malignancy or intestinal metaplasia, foveolar gastric and body hyperplasia compatible with chemical gastritis due to use of nonsteroidal anti-inflammatory drugs and esophageal biopsies revealed squamocolumnar mucosa with reflux type changes negative for Rojas's or eosinophilic esophagitis.  Today the patient returns to the office stating that she has significantly improved once she stopped using anti-inflammatories on a regular basis and started taking pantoprazole 40 mg daily for the recently diagnosed gastric ulcer, 4 mm in length located in the antrum, biopsies revealed foveolar hyperplasia consistent with ulcer border negative for H. pylori or dysplasia or metaplasia, there was no evidence of malignancy.  The patient was also found to have changes highly suggestive of chemical gastritis due to the use of nonsteroidal anti-inflammatory drugs.  Currently the patient denies having abdominal pain, heartburn, nausea, vomiting, dysphagia or odynophagia.  The patient recently passed kidney stones.  The patient is due to have a follow-up CT scan so we can further evaluate the hypodense mass in the root of the mesentery which had slightly grown in between 2 previous CT scans from 13 x 10 mm to 15 x 10 mm.  The patient has extensive degenerative spinal disease.  The patient will be contacted with results and recommendations and will return for a follow-up visit once she completes her evaluation.

## 2024-11-08 NOTE — PHYSICAL EXAM CONSTITUTIONAL:
Overweight, ell developed, well nourished, in no acute distress, ambulating without difficulty, normal communication ability

## 2024-11-13 ENCOUNTER — RX ONLY (OUTPATIENT)
Age: 69
Setting detail: RX ONLY
End: 2024-11-13

## 2024-11-13 RX ORDER — BETAMETHASONE DIPROPIONATE 0.5 MG/ML
LOTION TOPICAL
Qty: 60 | Refills: 1 | Status: ERX | COMMUNITY
Start: 2024-11-13

## 2025-01-02 ENCOUNTER — APPOINTMENT (OUTPATIENT)
Dept: URBAN - METROPOLITAN AREA OTHER 10 | Facility: OTHER | Age: 70
Setting detail: DERMATOLOGY
End: 2025-01-02

## 2025-01-02 DIAGNOSIS — L65.8 OTHER SPECIFIED NONSCARRING HAIR LOSS: ICD-10-CM | Status: IMPROVED

## 2025-01-02 DIAGNOSIS — L82.1 OTHER SEBORRHEIC KERATOSIS: ICD-10-CM

## 2025-01-02 PROCEDURE — ? MEDICATION COUNSELING

## 2025-01-02 PROCEDURE — ? PRESCRIPTION MEDICATION MANAGEMENT

## 2025-01-02 PROCEDURE — ? COUNSELING

## 2025-01-02 PROCEDURE — ? PRESCRIPTION

## 2025-01-02 PROCEDURE — 99214 OFFICE O/P EST MOD 30 MIN: CPT

## 2025-01-02 RX ORDER — BETAMETHASONE DIPROPIONATE 0.5 MG/ML
LOTION TOPICAL
Qty: 60 | Refills: 4 | Status: ERX

## 2025-01-02 ASSESSMENT — LOCATION ZONE DERM
LOCATION ZONE: TRUNK
LOCATION ZONE: FACE

## 2025-01-02 ASSESSMENT — LOCATION DETAILED DESCRIPTION DERM
LOCATION DETAILED: RIGHT LATERAL BREAST 7-8:00 REGION
LOCATION DETAILED: LEFT FOREHEAD
LOCATION DETAILED: RIGHT FOREHEAD

## 2025-01-02 ASSESSMENT — LOCATION SIMPLE DESCRIPTION DERM
LOCATION SIMPLE: LEFT FOREHEAD
LOCATION SIMPLE: RIGHT BREAST
LOCATION SIMPLE: RIGHT FOREHEAD

## 2025-01-02 NOTE — PROCEDURE: COUNSELING
Detail Level: Simple
Patient Specific Counseling (Will Not Stick From Patient To Patient): Instructed to start minoxidil topically daily, betamethasone for inflammation only
Detail Level: Detailed

## 2025-01-02 NOTE — PROCEDURE: PRESCRIPTION MEDICATION MANAGEMENT
Detail Level: Zone
Continue Regimen: Betamethasone dipropionate 0.05% lotion - Apply to scalp once nightly as needed for itching/inflammation
Render In Strict Bullet Format?: No

## 2025-01-16 ENCOUNTER — APPOINTMENT (OUTPATIENT)
Dept: URBAN - METROPOLITAN AREA OTHER 10 | Facility: OTHER | Age: 70
Setting detail: DERMATOLOGY
End: 2025-01-16

## 2025-01-16 DIAGNOSIS — Z41.9 ENCOUNTER FOR PROCEDURE FOR PURPOSES OTHER THAN REMEDYING HEALTH STATE, UNSPECIFIED: ICD-10-CM

## 2025-01-16 PROCEDURE — ? COSMETIC CONSULTATION: BOTULINUM TOXIN

## 2025-01-16 PROCEDURE — ? COSMETIC CONSULTATION: FILLERS

## 2025-01-16 PROCEDURE — ? BOTOX

## 2025-01-16 ASSESSMENT — LOCATION DETAILED DESCRIPTION DERM
LOCATION DETAILED: LEFT INFERIOR TEMPLE
LOCATION DETAILED: RIGHT INFERIOR LATERAL FOREHEAD
LOCATION DETAILED: LEFT FOREHEAD
LOCATION DETAILED: RIGHT MEDIAL FOREHEAD
LOCATION DETAILED: LEFT INFERIOR FOREHEAD
LOCATION DETAILED: GLABELLA
LOCATION DETAILED: RIGHT MID TEMPLE
LOCATION DETAILED: RIGHT INFERIOR TEMPLE
LOCATION DETAILED: LEFT MID TEMPLE
LOCATION DETAILED: RIGHT INFERIOR FOREHEAD
LOCATION DETAILED: LEFT INFERIOR MEDIAL FOREHEAD
LOCATION DETAILED: RIGHT FOREHEAD

## 2025-01-16 ASSESSMENT — LOCATION SIMPLE DESCRIPTION DERM
LOCATION SIMPLE: LEFT TEMPLE
LOCATION SIMPLE: GLABELLA
LOCATION SIMPLE: RIGHT FOREHEAD
LOCATION SIMPLE: RIGHT TEMPLE
LOCATION SIMPLE: LEFT FOREHEAD

## 2025-01-16 ASSESSMENT — LOCATION ZONE DERM: LOCATION ZONE: FACE

## 2025-01-16 NOTE — PROCEDURE: BOTOX
Nasal Root Units: 0
Show Glabellar Units: Yes
Glabellar Complex Units: 12
Show Mentalis Units: No
Additional Area 1 Units: 6
Dilution (U/0.1 Cc): 4
Price (Use Numbers Only, No Special Characters Or $): 390
Consent: Written consent obtained. Risks include but not limited to lid/brow ptosis, bruising, swelling, diplopia, temporary effect, incomplete chemical denervation.
Reconstitution Date (Optional): 12/26/2024
Incrementing Botox Units: By 0.5 Units
Comments: Barcode- [id number]
Post-Care Instructions: Patient instructed to not lie down for 4 hours and limit physical activity for 24 hours. Patient instructed not to travel by airplane for 48 hours.
Detail Level: Zone
Additional Area 1 Location: Around Eyes
Lot #: [id number]
Expiration Date (Month Year): 09/2026
Forehead Units: 8

## 2025-05-20 ENCOUNTER — APPOINTMENT (OUTPATIENT)
Dept: URBAN - METROPOLITAN AREA OTHER 4 | Facility: OTHER | Age: 70
Setting detail: DERMATOLOGY
End: 2025-05-20

## 2025-05-20 DIAGNOSIS — L50.1 IDIOPATHIC URTICARIA: ICD-10-CM

## 2025-05-20 PROCEDURE — ? PRESCRIPTION MEDICATION MANAGEMENT

## 2025-05-20 PROCEDURE — ? PRESCRIPTION

## 2025-05-20 PROCEDURE — ? COUNSELING

## 2025-05-20 PROCEDURE — 99213 OFFICE O/P EST LOW 20 MIN: CPT

## 2025-05-20 RX ORDER — HYDROXYZINE HYDROCHLORIDE 25 MG/1
TABLET, FILM COATED ORAL
Qty: 60 | Refills: 1 | Status: ERX | COMMUNITY
Start: 2025-05-20

## 2025-05-20 RX ADMIN — HYDROXYZINE HYDROCHLORIDE: 25 TABLET, FILM COATED ORAL at 00:00

## 2025-05-20 ASSESSMENT — LOCATION ZONE DERM
LOCATION ZONE: ARM
LOCATION ZONE: LEG
LOCATION ZONE: TRUNK

## 2025-05-20 ASSESSMENT — LOCATION SIMPLE DESCRIPTION DERM
LOCATION SIMPLE: LEFT PRETIBIAL REGION
LOCATION SIMPLE: LEFT FOREARM
LOCATION SIMPLE: LEFT UPPER BACK
LOCATION SIMPLE: RIGHT THIGH
LOCATION SIMPLE: RIGHT FOREARM

## 2025-05-20 ASSESSMENT — LOCATION DETAILED DESCRIPTION DERM
LOCATION DETAILED: LEFT PROXIMAL DORSAL FOREARM
LOCATION DETAILED: LEFT INFERIOR MEDIAL UPPER BACK
LOCATION DETAILED: LEFT PROXIMAL PRETIBIAL REGION
LOCATION DETAILED: RIGHT ANTERIOR DISTAL THIGH
LOCATION DETAILED: RIGHT PROXIMAL DORSAL FOREARM

## 2025-06-16 ENCOUNTER — OFFICE VISIT (OUTPATIENT)
Dept: URBAN - METROPOLITAN AREA CLINIC 74 | Facility: CLINIC | Age: 70
End: 2025-06-16
Payer: MEDICARE

## 2025-06-16 DIAGNOSIS — K57.30 DIVERTICULOSIS OF COLON WITHOUT DIVERTICULITIS: ICD-10-CM

## 2025-06-16 DIAGNOSIS — K63.5 HYPERPLASTIC POLYP OF TRANSVERSE COLON: ICD-10-CM

## 2025-06-16 DIAGNOSIS — E66.3 OVERWEIGHT (BMI 25.0-29.9): ICD-10-CM

## 2025-06-16 DIAGNOSIS — Z87.11 HISTORY OF GASTRIC ULCER: ICD-10-CM

## 2025-06-16 DIAGNOSIS — K76.89 LIVER CYST: ICD-10-CM

## 2025-06-16 DIAGNOSIS — K66.8 MESENTERIC CYST: ICD-10-CM

## 2025-06-16 DIAGNOSIS — Z83.71 FAMILY HISTORY OF COLONIC POLYPS: ICD-10-CM

## 2025-06-16 DIAGNOSIS — K21.9 GERD WITHOUT ESOPHAGITIS: ICD-10-CM

## 2025-06-16 DIAGNOSIS — G47.30 SLEEP APNEA, UNSPECIFIED TYPE: ICD-10-CM

## 2025-06-16 DIAGNOSIS — K29.50 CHRONIC GASTRITIS WITHOUT BLEEDING, UNSPECIFIED GASTRITIS TYPE: ICD-10-CM

## 2025-06-16 DIAGNOSIS — K29.60 CHEMICAL GASTRITIS: ICD-10-CM

## 2025-06-16 PROBLEM — 275548000: Status: ACTIVE | Noted: 2025-06-16

## 2025-06-16 PROCEDURE — 99213 OFFICE O/P EST LOW 20 MIN: CPT | Performed by: INTERNAL MEDICINE

## 2025-06-16 RX ORDER — FLUTICASONE PROPIONATE 50 UG/1
1 SPRAY IN EACH NOSTRIL SPRAY, METERED NASAL ONCE A DAY
Status: ACTIVE | COMMUNITY

## 2025-06-16 RX ORDER — ATORVASTATIN CALCIUM 40 MG/1
1 TABLET TABLET, FILM COATED ORAL ONCE A DAY
Status: ACTIVE | COMMUNITY

## 2025-06-16 RX ORDER — PANTOPRAZOLE SODIUM 40 MG/1
1 TABLET TABLET, DELAYED RELEASE ORAL ONCE A DAY
Qty: 90 | Refills: 3

## 2025-06-16 RX ORDER — PANTOPRAZOLE SODIUM 40 MG/1
1 TABLET TABLET, DELAYED RELEASE ORAL ONCE A DAY
Status: ACTIVE | COMMUNITY

## 2025-06-16 NOTE — PHYSICAL EXAM CONSTITUTIONAL:
Overweight, well-developed, well nourished , in no acute distress , ambulating without difficulty , normal communication ability

## 2025-06-16 NOTE — HPI-TODAY'S VISIT:
This is a 66-year-old female referred by Dr Allen for a GI consultation of colon cancer screening and GERD and a copy of this note was sent to the referring provider.  She had been on Nexium in the past for GERD and was using a CPAP machine at night for sleep apnea but complaining of chronic cough..  Patient also had chronic constipation.  Patient had an EGD on July 5, 2016 that revealed some gastric erythema and erythema of the lower esophagus biopsies were taken.  Patient had a colonoscopy as well on the same day for first-degree relative with history of polyps and constipation and this revealed internal hemorrhoids otherwise normal exam Dr. Hong recommended repeat exam in 5 years.  Biopsy of the stomach showed reactive gastropathy but no H. pylori and esophageal biopsy was normal.  Patient also had a barium swallow on September 20, 2016 that was normal.  CT scan of the abdomen was done on August 9, 2016 which revealed a normal gallbladder, normal liver, normal pancreas. Pt say she goes about every other day in terms of her bowels.Has chronic cough. This year she saw ENT and pulmonary doctors. ENT did a full work up and negative. Pulmonary will f/u on a lung nodule. Pt has severe allergies and is being treated for this. protonix helps GERD. cough has improved a lot after medrol dosepak. not needing tessalon perles  Today June 1, 2023 the patient returns for a follow-up visit, the patient was last seen by Dr. Parsih on October 3, 2022 with gastroesophageal reflux, chronic cough, sleep apnea, chronic idiopathic constipation, family history of colon polyps, the patient was to be scheduled for a colon cancer screening.  The patient has been treated with Nexium for GERD and was using the CPAP machine at night for sleep apnea but did complain of chronic cough.  The patient had chronic constipation.  The patient had an EGD on July 5, 2016 which revealed gastric erythema, erythema in the lower esophagus, biopsies were taken.  The patient was found to have reactive gastropathy H. pylori negative and the esophageal biopsies were normal.  The patient had a colonoscopy on the same day which revealed internal hemorrhoids.  At that time the patient was advised to get a colonoscopy in 5 years.  A barium swallow performed September 20, 2016 was normal.  A CT scan of the abdomen performed on August 9, 2016 revealed a normal gallbladder, normal liver, normal pancreas.  The patient had been seen by ENT and pulmonary medicine for chronic cough and the work-up was negative.  The patient had severe allergies and was being treated and along with it was taking Protonix for GERD.  The patient was suspected to have a multifactorial cough possibly allergies and bronchial spasm, in the past neutral Dosepak helped.  The patient was to be scheduled for an EGD and a colonoscopy.  The patient's EGD performed on October 27, 2022 revealed a 1 cm hiatal hernia mild chemical reactive gastropathy H. pylori negative and negative for intestinal metaplasia, squamocolumnar mucosa with reflux type changes.  On the same day the patient had a colonoscopy which revealed a 5 mm transverse sessile polyp which was hyperplastic.  The patient was found to have small nonbleeding internal hemorrhoids and sigmoid, descending colon and transverse colon diverticulosis with no evidence of diverticulitis or bleeding.  Today the patient returns to the office to get results from the previous colonoscopy and EGD performed by Dr. Parish.  The patient was made aware that on the EGD she was found to have a hiatal hernia, mild chemical reactive gastropathy H. pylori negative and negative for intestinal metaplasia, the esophagus showed reflux type changes with no evidence of Rojas's.  The colonoscopy revealed a 5 mm transverse sessile hyperplastic polyp, small nonbleeding internal hemorrhoids and diverticulosis throughout the sigmoid descending and transverse colon.  The patient states that as long as she follows antireflux measures and diet and takes pantoprazole 40 mg daily she has no acid reflux.  Otherwise she becomes symptomatic.  The patient denies having any dysphagia, odynophagia, there was no nausea or vomiting.  There was no evidence of GI bleeding.  Bowel movements remain type IV on the LaPorte scale, there is no blood in the stool.  The patient was advised to follow antireflux measures and diet, continue to take pantoprazole 40 mg daily.  The patient's next colonoscopy will be due in 2027 in view of the family history of colonic polyps, she will remain on a high-fiber diet.  The patient inquired about her 2016 liver testing where she was found to have either a liver cyst or hemangioma, there has been no follow-up appointment, the patient will be scheduled to have an ultrasound of the right upper quadrant, she will be contacted with reports and recommendations.  As long as doing well the patient will return for follow-up visit in 1 year or as needed.  Today November 6, 2023 the patient returns for a follow-up visit, the patient was last seen on June 1, 2023 with GERD without esophagitis, chronic gastritis without bleeding, family history of colon polyps, colonic diverticulosis without diverticulitis, a hyperplastic transverse colon polyp, sleep apnea, chronic cough, liver lesion, the patient was overweight.  At the time of the last visit the patient returns after having an EGD and a colonoscopy performed by Dr. Calloway, on the EGD she was found to have a hiatal hernia, mild chemical reactive gastropathy H. pylori negative, negative for intestinal metaplasia, the esophagus showed reflux type changes with no evidence of Rojas's. The colonoscopy revealed a 5 mm transverse sessile hyperplastic polyp, small nonbleeding internal hemorrhoids and diverticulosis throughout the sigmoid descending and transverse colon. The patient stated that as long as she followed  antireflux measures and diet and was taking pantoprazole 40 mg daily she had no acid reflux otherwise she became symptomatic. The patient denies having any dysphagia, odynophagia, there was no nausea or vomiting.  The patient denies having any GI bleeding.  The patient's bowel movements remain type IV on the LaPorte scale, there was no blood in the stool. The patient at the time was advised to follow antireflux measures and diet, continue taking pantoprazole 40 mg daily.  The patient was advised to get a colonoscopy in 2027.  The patient did have family history of colon polyps.  The patient was advised to follow a high-fiber diet. The patient inquired about the 2016 liver testing where she was found to have a either a liver cyst or hemangioma, there had been no follow-up since.  The patient was scheduled to have a right upper quadrant ultrasound.  The ultrasound was performed on June 20, 2023 and it revealed a simple cyst in the left lobe of the liver measuring 1.2 x 1.0 x 0.8 cm, the patient had a normal common bile duct measuring 2 mm and no gallstones.  The right kidney was normal. A CT of the abdomen and pelvis performed on July 13, 2023 revealed a normal-looking liver, normal gallbladder, pancreas, spleen, adrenals.  The patient had moderate right-sided hydroureteronephrosis secondary to a 0.5 cm stone in the distal right ureter measuring 440 Hounsfield units, there were also significant infiltrative changes around the right kidney, there were additional nonobstructing stones in the right upper and mid kidney the patient's the patient's stomach, small bowel and colon appeared to be normal, the appendix was not identified, there was no inflammatory change.  The patient did have a posterior fusion of L4 and L5 with disc prostheses and postoperative changes as well as degenerative disc disease of L2-L3 and L3-L4.  Today the patient returns to the office stating that she is doing reasonably well, she had been seen by her OB/GYN physician for some left inguinal discomfort, currently she is having normal bowel movements, denies having any diarrhea, constipation, rectal bleeding or hematochezia.  She claims that as the day goes on she developed some left inguinal discomfort.The patient denied having any upper GI symptoms such as dysphagia, odynophagia, nausea, vomiting, heartburn. I discussed with the patient the 2016 liver ultrasound which revealed a 7 mm hemangioma, subsequently discussed with the patient the ultrasound performed in June 2023 which now revealed a 1.2 x 1.0 x 0.8 cm simple cyst with reverberation artifact in the left lobe of the liver, normal, bile duct and gallbladder, normal liver otherwise and the CT scan performed in July 2023 for a kidney stone that showed a normal liver.  The patient is very concerned about her liver, the patient agreed to get an MRI with and without contrast to clarify the type of lesion she does or not have and an alpha-fetoprotein.  We will contact the patient with reports of recommendations.  The patient will return for a follow-up visit in 6 months or as needed.  Today February 7, 2024 the patient returns for a follow-up visit, the patient was last seen on November 6, 2023 with GERD without esophagitis, chronic gastritis without bleeding, chronic cough, colonic diverticulosis without diverticulitis, history of a hyperplastic transverse colon polyp, family history of colon polyps, sleep apnea, the patient was overweight and had a liver lesion as well as kidney stones.  At the time of the last visit the patient appeared to be doing reasonably well, the patient had been seen by her OB/GYN physician for left inguinal discomfort. The patient was having normal bowel movements, denied having diarrhea or constipation, there was no rectal bleeding or hematochezia.  The patient stated that that inguinal discomfort appeared during the day as the day went on it got better.  The patient denied having any upper GI symptoms such as dysphagia, odynophagia, nausea vomiting or heartburn. I discussed with the patient 2016 liver ultrasound which revealed a 7 mm hemangioma, we also discussed the ultrasound performed June 2023 which revealed a 1.2 x 1.0 x 0.8 cm simple cyst in the left lobe of the liver, normal bile ducts, normal gallbladder. A CT scan performed July 2023 for a kidney stone showed a normal liver. The patient was extremely concerned about her liver condition and agreed to get an MRI with and without contrast. On November 6, 2023 the patient had a low alpha-fetoprotein of 1.0 The MRI performed on November 21, 2023 revealed a 1 cm cyst in the medial segment, a small nonenhancing structure of uncertain etiology difficult to localize on the coronal sequences but it did not appear to be a worrisome lesion, the gallbladder and bile ducts appeared to be normal, the pancreas, spleen, adrenals, kidneys were unremarkable there was no lymphadenopathy. In the root of the small bowel mesentery abutting the superior mesenteric artery there is an 11 mm cyst, it also abuts the third portion of the duodenum but there was no definite enhancement, the abdominal wall appeared to be normal, there was postoperative change from lumbar spinal surgery with small amount of fluid in the posterior soft tissues similar to previous CT. The radiologist recommended follow-up of the small cyst in the root of the small bowel mesentery since it was not seen on a CT scan of May 2016.  Today the patient returns to the office stating that she is doing reasonably well, denied having any abdominal pain, did complain of increasing gastroesophageal reflux and cough, mostly at night, the patient had stopped taking the pantoprazole and was not compliant with antireflux measures and diet.The patient has been advised to follow antireflux measures and diet and resume pantoprazole 40 mg daily.  The patient does have an ENT appointment scheduled. The patient is currently having normal bowel movements, there has been no evidence of GI bleeding. 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 The patient had a follow-up of CT scan performed on January 29, 2024 which again revealed a normal stomach, small bowel and colon besides a very redundant ascending colon crossing the midline with the cecum projecting in the left upper quadrant.  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  There was no lymphadenopathy, the liver otherwise gallbladder, pancreas spleen and adrenals appear to be normal the patient had bilateral nonobstructive nephrolithiasis. After discussing with the patient at length the previous findings we agreed to obtain a follow-up CT scan in 6 months, if the lesion remains unchanged he will then be followed in 12 to 24 months. The patient will return for a follow-up visit after her follow-up CT scan.  Today August 7, 2024 the patient returns for follow-up visit, the patient was last seen on February 7, 2024 with GERD without esophagitis, chronic gastritis without bleeding, family history of colon polyps, colonic diverticulosis without diverticulitis, personal history of a hyperplastic polyp in the transverse colon, liver disease, the patient was overweight, mesenteric cyst, chronic cough and sleep apnea.  At the time of the visit the patient denied having any abdominal pain, did complain of increasing gastroesophageal reflux and cough, it occurred mostly during the night, the patient stopped taking pantoprazole and was noncompliant on antireflux measures and diet.  At this time the patient was advised to follow antireflux measures and diet, resume pantoprazole 40 mg daily.  During the visit the patient states she was having normal bowel movements, there was no evidence of GI bleeding.  We discussed the previous MRI performed November 21, 2023 which revealed again a 1 cm hepatic and a new 8 mm millimeter mesenteric cyst located in the root of the small bowel mesentery abutting the superior mesenteric artery and the duodenum.  A follow-up CT scan performed January 29, 2024 revealed a normal stomach, normal small bowel and colon besides a very redundant ascending colon crossing the midline with the cecum projecting in the left upper quadrant.  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  There was no lymphadenopathy, liver, gallbladder, pancreas and spleen and adrenals were normal, there was bilateral nonobstructive nephrolithiasis.  At the time the patient agreed to return in 6 months with a follow-up CT scan, if unchanged then patient would then be advised to follow-up on the previously mentioned lesions in 12 to 24 months.  The CT scan performed July 5, 2024 revealed a 9 mm fluid density mass with in the medial left hepatic lobe adjacent to the falciform ligament unchanged when compared to the previous exam consistent with a cyst, normal biliary ducts and gallbladder, normal spleen.  There was mild local distention of the pancreatic duct in the pancreatic head measuring up to 5 mm similar appearance on a CT scan from July 13, 2023, the pancreatic duct appeared normal on the comparison MRI in 2023 possibly the change in appearance may be related to volume averaging with adjacent fat.  The patient had bilateral renal calculi, the stomach, small bowel and appendix were normal, the patient had a hypodense mass adjacent to the superior mesenteric artery overlying the third portion of the duodenum measuring 1.5 x 1.0 cm, on comparison on CTA measured 1.2 x 1.0 cm on CT from 7/13/2023 1.3 x 0.9, measuring 27 Hounsfield units slightly more dense than simple fluid although density measurement is limited by streak artifact related to lumbar spine hardware.  The patient had extensive spinal changes including a lumbar interbody fusion of L5-S1.  Radiology recommended the patient get a follow-up contrast-enhanced CT of this particular lesion in 3 to 6 months.  Today the patient returns to the office stating that after taking over-the-counter ibuprofen for hand pain she developed intense gastroesophageal reflux and heartburn but lasted for several days, that did not change while taking pantoprazole 40 mg daily.  The patient has also noticed that for the past 2 months she has had intermittent episodes of mid esophageal dysphagia to solids but not liquids.  Currently the heartburn has gradually gotten better.  There has been no nausea, vomiting, hematemesis or melena.  Bowel movements remain type IV on the LaPorte scale with no blood.  I discussed at length the CT findings and the patient was made aware that she had a hypodense mass in the root of the mesentery slightly larger than on the previous CT, now measuring 15 x 10 mm previously 13 x 10.  The radiologist recommended a follow-up CT with contrast in 3 to 6 months, the patient chose 6 months.  The patient also was found to have an L4-L5 anterior and posterior lumbar interbody fusion with an interval and compression fracture of the L5 vertebral body with 25% loss of vertebral body height and mild lucency adjacent to the left L5 pedicle suggestive of hardware loosening.  .  The patient was made aware he should be seen by her orthopedic surgeon.  The patient is being scheduled to have an EGD, benefits potential complications and alternatives have been disclosed, she will follow antireflux measures and diet and remain on pantoprazole 40 mg daily.  The patient's next colonoscopy will be due in 2027.  Today November 8, 2024 the patient returns for a follow-up visit, the patient was last seen on August 7, 2024 with GERD without esophagitis, heartburn, esophageal dysphagia, chronic gastritis, colonic diverticulosis, history of hyperplastic colonic polyps, family history of colon polyps, sleep apnea, chronic cough, the patient was overweight, had a liver cyst and a mesenteric cyst.  At the time of the visit the patient complained of intense gastroesophageal reflux and heartburn which started after taking ibuprofen for hand pain, the heartburn did not improve on pantoprazole 40 mg daily.  The patient did have for a period of 2 months intermittent episodes of mid esophageal dysphagia to solids but not liquids.  The patient denied having nausea or vomiting and there was no evidence of GI bleeding.  Bowel movements remained type IV on the LaPorte scale with no blood.    At the time we discussed the recent CT of the abdomen and we made aware the patient that she had a hypodense mass in the root of the mesentery slightly larger than on previous CT scan, currently measuring 15 x 10 mm previously 13 x 10 mm.  Radiologist recommended follow-up within 3 to 6 months and the patient decided to get the follow-up in 6 months.  The patient also was found to have a L4-L5 interbody fusion, interval compression of fracture of L5 body with 25% loss of vertebral body height and changes suggestive of hardware loosening.  The patient was advised to be seen by orthopedic surgery.  The patient was scheduled to have an EGD and was advised to follow antireflux measures and diet and continue taking pantoprazole 40 mg daily.  The EGD was performed on September 17, 2024, the EGD revealed a variable Z-line, a linear gastric ulcer in the gastric antrum measuring 4 mm in the largest dimension, antral erythema and normal duodenal mucosa.  Biopsies revealed foveolar hyperplasia consistent with ulcer border negative for H. pylori dysplasia or malignancy or intestinal metaplasia, foveolar gastric and body hyperplasia compatible with chemical gastritis due to use of nonsteroidal anti-inflammatory drugs and esophageal biopsies revealed squamocolumnar mucosa with reflux type changes negative for Rojas's or eosinophilic esophagitis.  Today the patient returns to the office stating that she has significantly improved once she stopped using anti-inflammatories on a regular basis and started taking pantoprazole 40 mg daily for the recently diagnosed gastric ulcer, 4 mm in length located in the antrum, biopsies revealed foveolar hyperplasia consistent with ulcer border negative for H. pylori or dysplasia or metaplasia, there was no evidence of malignancy.  The patient was also found to have changes highly suggestive of chemical gastritis due to the use of nonsteroidal anti-inflammatory drugs.  Currently the patient denies having abdominal pain, heartburn, nausea, vomiting, dysphagia or odynophagia.  The patient recently passed kidney stones.  The patient is due to have a follow-up CT scan so we can further evaluate the hypodense mass in the root of the mesentery which had slightly grown in between 2 previous CT scans from 13 x 10 mm to 15 x 10 mm.  The patient has extensive degenerative spinal disease.  The patient will be contacted with results and recommendations and will return for a follow-up visit once she completes her evaluation.  Today June 16, 2025 the patient returns for a follow-up visit, the patient was last seen on November 8 2024 with an acute gastric ulcer without hemorrhage or perforation, chemical gastritis, GERD without esophagitis, esophageal dysphagia, heartburn, chronic gastritis, colonic diverticulosis without diverticulitis, a hyperplastic transverse colon polyp, family history of colonic polyps, sleep apnea, patient was overweight had a liver cyst and a mesenteric cyst as well as chronic cough.  At the time of the visit the patient appeared to have significantly improved, once the patient stopped anti-inflammatories on a regular basis and was taking pantoprazole 40 mg for the 4 mm gastric ulcer the patient appeared to be doing better, the patient denied having abdominal pain, heartburn nausea vomiting, there was no dysphagia or odynophagia.  The patient has also passed kidney stones.  The patient was advised to get a CT scan of the abdomen and pelvis for further evaluation of the hypodense mass in the root of the mesentery, the patient's lesion had slightly grown between the 2 previous CT scans from 13 mm x 10 mm to 15 x 10 mm.    The CT scan performed on November 12, 2024 revealed no liver solid masses, there was a 1 cm low-density cyst within the left lobe with no evidence of hepatomegaly, bile ducts appeared to be normal, the pancreas failed to show any abnormalities spleen showed no fecal abnormalities adrenal glands were normal, the aorta and retroperitoneum failed to show any abnormalities including no evidence of adenopathy and no clinical significant obstructive mesenteric arterial or venous disease bowels appear to be normal.  The patient had a small hiatal hernia.  At no point in time was there any mention about the previously identified hypodense mass adjacent to the superior mesenteric artery overlying the third portion of the duodenum.  Today the patient returns to the office stating that while sporadically taking anti-inflammatories and currently taking tramadol she had 1 week of persistent nausea and dyspeptic symptoms, the patient had vague abdominal discomfort, symptoms since then have resolved.  Currently denies having any dysphagia, dyne aphasia, sporadically might get slight nausea with no vomiting, there has been no hematemesis or melena.  Bowel movements remain regular and formed.  The patient is having severe pain on one of her knees, the opposite knee has been replaced.  The patient was rather concerned about the history of ulcer disease but since the symptoms have improved on pantoprazole 40 mg would rather continue taking the medication.  The patient stated that she would postpone having a follow-up EGD unless symptoms recur.  The patient will return for a follow-up visit in 6 months or as needed and in the event that the upper GI symptomatology returns we will contact the office to schedule an EGD.

## 2025-07-01 ENCOUNTER — APPOINTMENT (OUTPATIENT)
Dept: URBAN - METROPOLITAN AREA OTHER 7 | Facility: OTHER | Age: 70
Setting detail: DERMATOLOGY
End: 2025-07-01

## 2025-07-01 DIAGNOSIS — L50.1 IDIOPATHIC URTICARIA: ICD-10-CM

## 2025-07-01 DIAGNOSIS — F42.4 EXCORIATION (SKIN-PICKING) DISORDER: ICD-10-CM

## 2025-07-01 DIAGNOSIS — L29.89 OTHER PRURITUS: ICD-10-CM

## 2025-07-01 PROCEDURE — ? COUNSELING

## 2025-07-01 PROCEDURE — ? ORDER TESTS

## 2025-07-01 PROCEDURE — ? ADDITIONAL NOTES

## 2025-07-01 PROCEDURE — ? INTRAMUSCULAR KENALOG

## 2025-07-01 ASSESSMENT — LOCATION SIMPLE DESCRIPTION DERM
LOCATION SIMPLE: LEFT UPPER BACK
LOCATION SIMPLE: RIGHT BUTTOCK
LOCATION SIMPLE: RIGHT THIGH
LOCATION SIMPLE: LEFT FOREARM
LOCATION SIMPLE: RIGHT FOREARM
LOCATION SIMPLE: LEFT PRETIBIAL REGION

## 2025-07-01 ASSESSMENT — LOCATION DETAILED DESCRIPTION DERM
LOCATION DETAILED: RIGHT PROXIMAL DORSAL FOREARM
LOCATION DETAILED: RIGHT ANTERIOR DISTAL THIGH
LOCATION DETAILED: LEFT PROXIMAL PRETIBIAL REGION
LOCATION DETAILED: LEFT PROXIMAL DORSAL FOREARM
LOCATION DETAILED: LEFT INFERIOR MEDIAL UPPER BACK
LOCATION DETAILED: RIGHT BUTTOCK

## 2025-07-01 ASSESSMENT — LOCATION ZONE DERM
LOCATION ZONE: TRUNK
LOCATION ZONE: ARM
LOCATION ZONE: LEG

## 2025-07-16 RX ORDER — HYDROXYZINE HYDROCHLORIDE 25 MG/1
TABLET, FILM COATED ORAL
Qty: 60 | Refills: 1 | Status: ERX

## 2025-07-23 ENCOUNTER — LAB OUTSIDE AN ENCOUNTER (OUTPATIENT)
Dept: URBAN - METROPOLITAN AREA CLINIC 74 | Facility: CLINIC | Age: 70
End: 2025-07-23

## 2025-07-23 ENCOUNTER — OFFICE VISIT (OUTPATIENT)
Dept: URBAN - METROPOLITAN AREA CLINIC 74 | Facility: CLINIC | Age: 70
End: 2025-07-23
Payer: MEDICARE

## 2025-07-23 DIAGNOSIS — K66.8 MESENTERIC CYST: ICD-10-CM

## 2025-07-23 DIAGNOSIS — K76.89 LIVER CYST: ICD-10-CM

## 2025-07-23 DIAGNOSIS — R11.0 NAUSEA: ICD-10-CM

## 2025-07-23 DIAGNOSIS — K29.60 CHEMICAL GASTRITIS: ICD-10-CM

## 2025-07-23 DIAGNOSIS — K29.50 CHRONIC GASTRITIS WITHOUT BLEEDING, UNSPECIFIED GASTRITIS TYPE: ICD-10-CM

## 2025-07-23 DIAGNOSIS — K57.30 DIVERTICULOSIS OF COLON WITHOUT DIVERTICULITIS: ICD-10-CM

## 2025-07-23 DIAGNOSIS — K63.5 HYPERPLASTIC POLYP OF TRANSVERSE COLON: ICD-10-CM

## 2025-07-23 DIAGNOSIS — K59.01 SLOW TRANSIT CONSTIPATION: ICD-10-CM

## 2025-07-23 DIAGNOSIS — R19.4 CHANGE IN BOWEL HABIT: ICD-10-CM

## 2025-07-23 DIAGNOSIS — G47.30 SLEEP APNEA, UNSPECIFIED TYPE: ICD-10-CM

## 2025-07-23 DIAGNOSIS — K21.9 GERD WITHOUT ESOPHAGITIS: ICD-10-CM

## 2025-07-23 DIAGNOSIS — Z83.71 FAMILY HISTORY OF COLONIC POLYPS: ICD-10-CM

## 2025-07-23 DIAGNOSIS — Z87.11 HISTORY OF GASTRIC ULCER: ICD-10-CM

## 2025-07-23 DIAGNOSIS — R10.13 EPIGASTRIC BURNING SENSATION: ICD-10-CM

## 2025-07-23 DIAGNOSIS — E66.3 OVERWEIGHT (BMI 25.0-29.9): ICD-10-CM

## 2025-07-23 PROBLEM — 21005005: Status: ACTIVE | Noted: 2025-07-23

## 2025-07-23 PROBLEM — 422587007: Status: ACTIVE | Noted: 2025-07-23

## 2025-07-23 PROCEDURE — 99214 OFFICE O/P EST MOD 30 MIN: CPT | Performed by: INTERNAL MEDICINE

## 2025-07-23 RX ORDER — PANTOPRAZOLE SODIUM 40 MG/1
1 TABLET TABLET, DELAYED RELEASE ORAL TWICE A DAY
Qty: 180 TABLET | Refills: 1

## 2025-07-23 RX ORDER — FLUTICASONE PROPIONATE 50 UG/1
1 SPRAY IN EACH NOSTRIL SPRAY, METERED NASAL ONCE A DAY
Status: ACTIVE | COMMUNITY

## 2025-07-23 RX ORDER — SUCRALFATE 1 G/10ML
10 ML 1 HOUR BEFORE MEALS AND AT BEDTIME ON AN EMPTY STOMACH SUSPENSION ORAL
Qty: 1200 | OUTPATIENT
Start: 2025-07-23

## 2025-07-23 RX ORDER — ATORVASTATIN CALCIUM 40 MG/1
1 TABLET TABLET, FILM COATED ORAL ONCE A DAY
Status: ACTIVE | COMMUNITY

## 2025-07-23 RX ORDER — PANTOPRAZOLE SODIUM 40 MG/1
1 TABLET TABLET, DELAYED RELEASE ORAL ONCE A DAY
Qty: 90 | Refills: 3 | Status: ACTIVE | COMMUNITY

## 2025-07-23 NOTE — HPI-TODAY'S VISIT:
This is a 66-year-old female referred by Dr Allen for a GI consultation of colon cancer screening and GERD and a copy of this note was sent to the referring provider.  She had been on Nexium in the past for GERD and was using a CPAP machine at night for sleep apnea but complaining of chronic cough..  Patient also had chronic constipation.  Patient had an EGD on July 5, 2016 that revealed some gastric erythema and erythema of the lower esophagus biopsies were taken.  Patient had a colonoscopy as well on the same day for first-degree relative with history of polyps and constipation and this revealed internal hemorrhoids otherwise normal exam Dr. Hong recommended repeat exam in 5 years.  Biopsy of the stomach showed reactive gastropathy but no H. pylori and esophageal biopsy was normal.  Patient also had a barium swallow on September 20, 2016 that was normal.  CT scan of the abdomen was done on August 9, 2016 which revealed a normal gallbladder, normal liver, normal pancreas. Pt say she goes about every other day in terms of her bowels.Has chronic cough. This year she saw ENT and pulmonary doctors. ENT did a full work up and negative. Pulmonary will f/u on a lung nodule. Pt has severe allergies and is being treated for this. protonix helps GERD. cough has improved a lot after medrol dosepak. not needing tessalon perles  Today June 1, 2023 the patient returns for a follow-up visit, the patient was last seen by Dr. Parish on October 3, 2022 with gastroesophageal reflux, chronic cough, sleep apnea, chronic idiopathic constipation, family history of colon polyps, the patient was to be scheduled for a colon cancer screening.  The patient has been treated with Nexium for GERD and was using the CPAP machine at night for sleep apnea but did complain of chronic cough.  The patient had chronic constipation.  The patient had an EGD on July 5, 2016 which revealed gastric erythema, erythema in the lower esophagus, biopsies were taken.  The patient was found to have reactive gastropathy H. pylori negative and the esophageal biopsies were normal.  The patient had a colonoscopy on the same day which revealed internal hemorrhoids.  At that time the patient was advised to get a colonoscopy in 5 years.  A barium swallow performed September 20, 2016 was normal.  A CT scan of the abdomen performed on August 9, 2016 revealed a normal gallbladder, normal liver, normal pancreas.  The patient had been seen by ENT and pulmonary medicine for chronic cough and the work-up was negative.  The patient had severe allergies and was being treated and along with it was taking Protonix for GERD.  The patient was suspected to have a multifactorial cough possibly allergies and bronchial spasm, in the past neutral Dosepak helped.  The patient was to be scheduled for an EGD and a colonoscopy.  The patient's EGD performed on October 27, 2022 revealed a 1 cm hiatal hernia mild chemical reactive gastropathy H. pylori negative and negative for intestinal metaplasia, squamocolumnar mucosa with reflux type changes.  On the same day the patient had a colonoscopy which revealed a 5 mm transverse sessile polyp which was hyperplastic.  The patient was found to have small nonbleeding internal hemorrhoids and sigmoid, descending colon and transverse colon diverticulosis with no evidence of diverticulitis or bleeding.  Today the patient returns to the office to get results from the previous colonoscopy and EGD performed by Dr. Parish.  The patient was made aware that on the EGD she was found to have a hiatal hernia, mild chemical reactive gastropathy H. pylori negative and negative for intestinal metaplasia, the esophagus showed reflux type changes with no evidence of Rojas's.  The colonoscopy revealed a 5 mm transverse sessile hyperplastic polyp, small nonbleeding internal hemorrhoids and diverticulosis throughout the sigmoid descending and transverse colon.  The patient states that as long as she follows antireflux measures and diet and takes pantoprazole 40 mg daily she has no acid reflux.  Otherwise she becomes symptomatic.  The patient denies having any dysphagia, odynophagia, there was no nausea or vomiting.  There was no evidence of GI bleeding.  Bowel movements remain type IV on the Izard scale, there is no blood in the stool.  The patient was advised to follow antireflux measures and diet, continue to take pantoprazole 40 mg daily.  The patient's next colonoscopy will be due in 2027 in view of the family history of colonic polyps, she will remain on a high-fiber diet.  The patient inquired about her 2016 liver testing where she was found to have either a liver cyst or hemangioma, there has been no follow-up appointment, the patient will be scheduled to have an ultrasound of the right upper quadrant, she will be contacted with reports and recommendations.  As long as doing well the patient will return for follow-up visit in 1 year or as needed.  Today November 6, 2023 the patient returns for a follow-up visit, the patient was last seen on June 1, 2023 with GERD without esophagitis, chronic gastritis without bleeding, family history of colon polyps, colonic diverticulosis without diverticulitis, a hyperplastic transverse colon polyp, sleep apnea, chronic cough, liver lesion, the patient was overweight.  At the time of the last visit the patient returns after having an EGD and a colonoscopy performed by Dr. Calloway, on the EGD she was found to have a hiatal hernia, mild chemical reactive gastropathy H. pylori negative, negative for intestinal metaplasia, the esophagus showed reflux type changes with no evidence of Rojas's. The colonoscopy revealed a 5 mm transverse sessile hyperplastic polyp, small nonbleeding internal hemorrhoids and diverticulosis throughout the sigmoid descending and transverse colon. The patient stated that as long as she followed  antireflux measures and diet and was taking pantoprazole 40 mg daily she had no acid reflux otherwise she became symptomatic. The patient denies having any dysphagia, odynophagia, there was no nausea or vomiting.  The patient denies having any GI bleeding.  The patient's bowel movements remain type IV on the Izard scale, there was no blood in the stool. The patient at the time was advised to follow antireflux measures and diet, continue taking pantoprazole 40 mg daily.  The patient was advised to get a colonoscopy in 2027.  The patient did have family history of colon polyps.  The patient was advised to follow a high-fiber diet. The patient inquired about the 2016 liver testing where she was found to have a either a liver cyst or hemangioma, there had been no follow-up since.  The patient was scheduled to have a right upper quadrant ultrasound.  The ultrasound was performed on June 20, 2023 and it revealed a simple cyst in the left lobe of the liver measuring 1.2 x 1.0 x 0.8 cm, the patient had a normal common bile duct measuring 2 mm and no gallstones.  The right kidney was normal. A CT of the abdomen and pelvis performed on July 13, 2023 revealed a normal-looking liver, normal gallbladder, pancreas, spleen, adrenals.  The patient had moderate right-sided hydroureteronephrosis secondary to a 0.5 cm stone in the distal right ureter measuring 440 Hounsfield units, there were also significant infiltrative changes around the right kidney, there were additional nonobstructing stones in the right upper and mid kidney the patient's the patient's stomach, small bowel and colon appeared to be normal, the appendix was not identified, there was no inflammatory change.  The patient did have a posterior fusion of L4 and L5 with disc prostheses and postoperative changes as well as degenerative disc disease of L2-L3 and L3-L4.  Today the patient returns to the office stating that she is doing reasonably well, she had been seen by her OB/GYN physician for some left inguinal discomfort, currently she is having normal bowel movements, denies having any diarrhea, constipation, rectal bleeding or hematochezia.  She claims that as the day goes on she developed some left inguinal discomfort.The patient denied having any upper GI symptoms such as dysphagia, odynophagia, nausea, vomiting, heartburn. I discussed with the patient the 2016 liver ultrasound which revealed a 7 mm hemangioma, subsequently discussed with the patient the ultrasound performed in June 2023 which now revealed a 1.2 x 1.0 x 0.8 cm simple cyst with reverberation artifact in the left lobe of the liver, normal, bile duct and gallbladder, normal liver otherwise and the CT scan performed in July 2023 for a kidney stone that showed a normal liver.  The patient is very concerned about her liver, the patient agreed to get an MRI with and without contrast to clarify the type of lesion she does or not have and an alpha-fetoprotein.  We will contact the patient with reports of recommendations.  The patient will return for a follow-up visit in 6 months or as needed.  Today February 7, 2024 the patient returns for a follow-up visit, the patient was last seen on November 6, 2023 with GERD without esophagitis, chronic gastritis without bleeding, chronic cough, colonic diverticulosis without diverticulitis, history of a hyperplastic transverse colon polyp, family history of colon polyps, sleep apnea, the patient was overweight and had a liver lesion as well as kidney stones.  At the time of the last visit the patient appeared to be doing reasonably well, the patient had been seen by her OB/GYN physician for left inguinal discomfort. The patient was having normal bowel movements, denied having diarrhea or constipation, there was no rectal bleeding or hematochezia.  The patient stated that that inguinal discomfort appeared during the day as the day went on it got better.  The patient denied having any upper GI symptoms such as dysphagia, odynophagia, nausea vomiting or heartburn. I discussed with the patient 2016 liver ultrasound which revealed a 7 mm hemangioma, we also discussed the ultrasound performed June 2023 which revealed a 1.2 x 1.0 x 0.8 cm simple cyst in the left lobe of the liver, normal bile ducts, normal gallbladder. A CT scan performed July 2023 for a kidney stone showed a normal liver. The patient was extremely concerned about her liver condition and agreed to get an MRI with and without contrast. On November 6, 2023 the patient had a low alpha-fetoprotein of 1.0 The MRI performed on November 21, 2023 revealed a 1 cm cyst in the medial segment, a small nonenhancing structure of uncertain etiology difficult to localize on the coronal sequences but it did not appear to be a worrisome lesion, the gallbladder and bile ducts appeared to be normal, the pancreas, spleen, adrenals, kidneys were unremarkable there was no lymphadenopathy. In the root of the small bowel mesentery abutting the superior mesenteric artery there is an 11 mm cyst, it also abuts the third portion of the duodenum but there was no definite enhancement, the abdominal wall appeared to be normal, there was postoperative change from lumbar spinal surgery with small amount of fluid in the posterior soft tissues similar to previous CT. The radiologist recommended follow-up of the small cyst in the root of the small bowel mesentery since it was not seen on a CT scan of May 2016.  Today the patient returns to the office stating that she is doing reasonably well, denied having any abdominal pain, did complain of increasing gastroesophageal reflux and cough, mostly at night, the patient had stopped taking the pantoprazole and was not compliant with antireflux measures and diet.The patient has been advised to follow antireflux measures and diet and resume pantoprazole 40 mg daily.  The patient does have an ENT appointment scheduled. The patient is currently having normal bowel movements, there has been no evidence of GI bleeding. 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 The patient had a follow-up of CT scan performed on January 29, 2024 which again revealed a normal stomach, small bowel and colon besides a very redundant ascending colon crossing the midline with the cecum projecting in the left upper quadrant.  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  There was no lymphadenopathy, the liver otherwise gallbladder, pancreas spleen and adrenals appear to be normal the patient had bilateral nonobstructive nephrolithiasis. After discussing with the patient at length the previous findings we agreed to obtain a follow-up CT scan in 6 months, if the lesion remains unchanged he will then be followed in 12 to 24 months. The patient will return for a follow-up visit after her follow-up CT scan.  Today August 7, 2024 the patient returns for follow-up visit, the patient was last seen on February 7, 2024 with GERD without esophagitis, chronic gastritis without bleeding, family history of colon polyps, colonic diverticulosis without diverticulitis, personal history of a hyperplastic polyp in the transverse colon, liver disease, the patient was overweight, mesenteric cyst, chronic cough and sleep apnea.  At the time of the visit the patient denied having any abdominal pain, did complain of increasing gastroesophageal reflux and cough, it occurred mostly during the night, the patient stopped taking pantoprazole and was noncompliant on antireflux measures and diet.  At this time the patient was advised to follow antireflux measures and diet, resume pantoprazole 40 mg daily.  During the visit the patient states she was having normal bowel movements, there was no evidence of GI bleeding.  We discussed the previous MRI performed November 21, 2023 which revealed again a 1 cm hepatic and a new 8 mm millimeter mesenteric cyst located in the root of the small bowel mesentery abutting the superior mesenteric artery and the duodenum.  A follow-up CT scan performed January 29, 2024 revealed a normal stomach, normal small bowel and colon besides a very redundant ascending colon crossing the midline with the cecum projecting in the left upper quadrant.  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  There was no lymphadenopathy, liver, gallbladder, pancreas and spleen and adrenals were normal, there was bilateral nonobstructive nephrolithiasis.  At the time the patient agreed to return in 6 months with a follow-up CT scan, if unchanged then patient would then be advised to follow-up on the previously mentioned lesions in 12 to 24 months.  The CT scan performed July 5, 2024 revealed a 9 mm fluid density mass with in the medial left hepatic lobe adjacent to the falciform ligament unchanged when compared to the previous exam consistent with a cyst, normal biliary ducts and gallbladder, normal spleen.  There was mild local distention of the pancreatic duct in the pancreatic head measuring up to 5 mm similar appearance on a CT scan from July 13, 2023, the pancreatic duct appeared normal on the comparison MRI in 2023 possibly the change in appearance may be related to volume averaging with adjacent fat.  The patient had bilateral renal calculi, the stomach, small bowel and appendix were normal, the patient had a hypodense mass adjacent to the superior mesenteric artery overlying the third portion of the duodenum measuring 1.5 x 1.0 cm, on comparison on CTA measured 1.2 x 1.0 cm on CT from 7/13/2023 1.3 x 0.9, measuring 27 Hounsfield units slightly more dense than simple fluid although density measurement is limited by streak artifact related to lumbar spine hardware.  The patient had extensive spinal changes including a lumbar interbody fusion of L5-S1.  Radiology recommended the patient get a follow-up contrast-enhanced CT of this particular lesion in 3 to 6 months.  Today the patient returns to the office stating that after taking over-the-counter ibuprofen for hand pain she developed intense gastroesophageal reflux and heartburn but lasted for several days, that did not change while taking pantoprazole 40 mg daily.  The patient has also noticed that for the past 2 months she has had intermittent episodes of mid esophageal dysphagia to solids but not liquids.  Currently the heartburn has gradually gotten better.  There has been no nausea, vomiting, hematemesis or melena.  Bowel movements remain type IV on the Izard scale with no blood.  I discussed at length the CT findings and the patient was made aware that she had a hypodense mass in the root of the mesentery slightly larger than on the previous CT, now measuring 15 x 10 mm previously 13 x 10.  The radiologist recommended a follow-up CT with contrast in 3 to 6 months, the patient chose 6 months.  The patient also was found to have an L4-L5 anterior and posterior lumbar interbody fusion with an interval and compression fracture of the L5 vertebral body with 25% loss of vertebral body height and mild lucency adjacent to the left L5 pedicle suggestive of hardware loosening.  .  The patient was made aware he should be seen by her orthopedic surgeon.  The patient is being scheduled to have an EGD, benefits potential complications and alternatives have been disclosed, she will follow antireflux measures and diet and remain on pantoprazole 40 mg daily.  The patient's next colonoscopy will be due in 2027.  Today November 8, 2024 the patient returns for a follow-up visit, the patient was last seen on August 7, 2024 with GERD without esophagitis, heartburn, esophageal dysphagia, chronic gastritis, colonic diverticulosis, history of hyperplastic colonic polyps, family history of colon polyps, sleep apnea, chronic cough, the patient was overweight, had a liver cyst and a mesenteric cyst.  At the time of the visit the patient complained of intense gastroesophageal reflux and heartburn which started after taking ibuprofen for hand pain, the heartburn did not improve on pantoprazole 40 mg daily.  The patient did have for a period of 2 months intermittent episodes of mid esophageal dysphagia to solids but not liquids.  The patient denied having nausea or vomiting and there was no evidence of GI bleeding.  Bowel movements remained type IV on the Izard scale with no blood.    At the time we discussed the recent CT of the abdomen and we made aware the patient that she had a hypodense mass in the root of the mesentery slightly larger than on previous CT scan, currently measuring 15 x 10 mm previously 13 x 10 mm.  Radiologist recommended follow-up within 3 to 6 months and the patient decided to get the follow-up in 6 months.  The patient also was found to have a L4-L5 interbody fusion, interval compression of fracture of L5 body with 25% loss of vertebral body height and changes suggestive of hardware loosening.  The patient was advised to be seen by orthopedic surgery.  The patient was scheduled to have an EGD and was advised to follow antireflux measures and diet and continue taking pantoprazole 40 mg daily.  The EGD was performed on September 17, 2024, the EGD revealed a variable Z-line, a linear gastric ulcer in the gastric antrum measuring 4 mm in the largest dimension, antral erythema and normal duodenal mucosa.  Biopsies revealed foveolar hyperplasia consistent with ulcer border negative for H. pylori dysplasia or malignancy or intestinal metaplasia, foveolar gastric and body hyperplasia compatible with chemical gastritis due to use of nonsteroidal anti-inflammatory drugs and esophageal biopsies revealed squamocolumnar mucosa with reflux type changes negative for Rojas's or eosinophilic esophagitis.  Today the patient returns to the office stating that she has significantly improved once she stopped using anti-inflammatories on a regular basis and started taking pantoprazole 40 mg daily for the recently diagnosed gastric ulcer, 4 mm in length located in the antrum, biopsies revealed foveolar hyperplasia consistent with ulcer border negative for H. pylori or dysplasia or metaplasia, there was no evidence of malignancy.  The patient was also found to have changes highly suggestive of chemical gastritis due to the use of nonsteroidal anti-inflammatory drugs.  Currently the patient denies having abdominal pain, heartburn, nausea, vomiting, dysphagia or odynophagia.  The patient recently passed kidney stones.  The patient is due to have a follow-up CT scan so we can further evaluate the hypodense mass in the root of the mesentery which had slightly grown in between 2 previous CT scans from 13 x 10 mm to 15 x 10 mm.  The patient has extensive degenerative spinal disease.  The patient will be contacted with results and recommendations and will return for a follow-up visit once she completes her evaluation.  Today June 16, 2025 the patient returns for a follow-up visit, the patient was last seen on November 8 2024 with an acute gastric ulcer without hemorrhage or perforation, chemical gastritis, GERD without esophagitis, esophageal dysphagia, heartburn, chronic gastritis, colonic diverticulosis without diverticulitis, a hyperplastic transverse colon polyp, family history of colonic polyps, sleep apnea, patient was overweight had a liver cyst and a mesenteric cyst as well as chronic cough.  At the time of the visit the patient appeared to have significantly improved, once the patient stopped anti-inflammatories on a regular basis and was taking pantoprazole 40 mg for the 4 mm gastric ulcer the patient appeared to be doing better, the patient denied having abdominal pain, heartburn nausea vomiting, there was no dysphagia or odynophagia.  The patient has also passed kidney stones.  The patient was advised to get a CT scan of the abdomen and pelvis for further evaluation of the hypodense mass in the root of the mesentery, the patient's lesion had slightly grown between the 2 previous CT scans from 13 mm x 10 mm to 15 x 10 mm.    The CT scan performed on November 12, 2024 revealed no liver solid masses, there was a 1 cm low-density cyst within the left lobe with no evidence of hepatomegaly, bile ducts appeared to be normal, the pancreas failed to show any abnormalities spleen showed no fecal abnormalities adrenal glands were normal, the aorta and retroperitoneum failed to show any abnormalities including no evidence of adenopathy and no clinical significant obstructive mesenteric arterial or venous disease bowels appear to be normal.  The patient had a small hiatal hernia.  At no point in time was there any mention about the previously identified hypodense mass adjacent to the superior mesenteric artery overlying the third portion of the duodenum.  Today the patient returns to the office stating that while sporadically taking anti-inflammatories and currently taking tramadol she had 1 week of persistent nausea and dyspeptic symptoms, the patient had vague abdominal discomfort, symptoms since then have resolved.  Currently denies having any dysphagia, dyne aphasia, sporadically might get slight nausea with no vomiting, there has been no hematemesis or melena.  Bowel movements remain regular and formed.  The patient is having severe pain on one of her knees, the opposite knee has been replaced.  The patient was rather concerned about the history of ulcer disease but since the symptoms have improved on pantoprazole 40 mg would rather continue taking the medication.  The patient stated that she would postpone having a follow-up EGD unless symptoms recur.  The patient will return for a follow-up visit in 6 months or as needed and in the event that the upper GI symptomatology returns we will contact the office to schedule an EGD.  Today July 23, 2025 the patient returns for a follow-up visit, the patient was last seen on June 16, 2025 with chemical gastritis, history of gastric ulcer, GERD, colonic diverticulosis without diverticulitis, family history of colonic polyps, sleep apnea, and liver cyst, and mesenteric cyst, the patient was overweight.  At the time of the visit the patient stated taking sporadically anti-inflammatories, during the visit she stated taking tramadol, complaine of persistent nausea and dyspeptic symptoms, had vague abdominal discomfort.  Denied having any dysphagia, odynophagia, had occasional nausea with no vomiting, there was no evidence of GI bleeding.  Bowel movements were type IV on the Izard scale with no blood.  The patient complained mostly of knee pain.  The patient was rather concerned about the history of ulcer disease, symptoms have improved while on pantoprazole 40 mg, the patient agreed to remain on pantoprazole 40 mg daily, the patient asked to postpone an EGD unless her symptoms get worse or returned.  Today the patient returns to the office stating that for the past 2 to 3 weeks she has not been feeling well, complains of having excessive mucus draining out of the sinuses into her esophagus causing nausea, complains of frequent gastroesophageal reflux, heartburn and epigastric burning sensation, claims that she has experienced a change in bowel habit and is now constipated defecating every 4 to 5 days, denies having any hematemesis melena or hematochezia, denies having any fever, jaundice but has developed generalized itching over her skin which is currently being evaluated by dermatology.  The patient has also complained of intermittent episodes of mid esophageal dysphagia to solids but not liquids.  The patient states that she kept a clear liquid diet for several days with gradual improvement.  The patient has been taking pantoprazole 40 mg with all the average results.  The patient agreed to increase the pantoprazole to 40 mg twice daily, follow antireflux measures and diet and remain on a bland diet.  The patient also agreed to add sucralfate 1 g 1 hour AC and at bedtime, currently taking ondansetron 4 mg sublingual as needed prescribed by primary care.  The patient will try MiraLAX 17 g either once or twice daily for constipation.  In the past few weeks the patient was taking tramadol on a regular basis for knee pain, she claims that she suspended the medication a few days back.  The patient will be scheduled to have an EGD in view of the history of gastritis and gastric ulcer.  Benefits, alternatives to EGD and potential side effects such as perforation, bleeding, missed lesions and those related to anesthesia were disclosed.  The patient had lab work performed by dermatology 24 hours ago including CBC, CMP, sed rate and hepatitis panel, results are not available but will be obtained for review.  The patient had an abdominal plain film on July 22, 2025 which revealed right nephrolithiasis with no evidence of ureteral obstruction.  The patient is currently being followed by either urology or nephrology.  The patient will return for follow-up visit once she completes her current evaluation.

## 2025-07-29 ENCOUNTER — OFFICE VISIT (OUTPATIENT)
Dept: URBAN - METROPOLITAN AREA SURGERY CENTER 30 | Facility: SURGERY CENTER | Age: 70
End: 2025-07-29

## 2025-07-29 RX ORDER — FLUTICASONE PROPIONATE 50 UG/1
1 SPRAY IN EACH NOSTRIL SPRAY, METERED NASAL ONCE A DAY
Status: ACTIVE | COMMUNITY

## 2025-07-29 RX ORDER — PANTOPRAZOLE SODIUM 40 MG/1
1 TABLET TABLET, DELAYED RELEASE ORAL TWICE A DAY
Qty: 180 TABLET | Refills: 1 | Status: ACTIVE | COMMUNITY

## 2025-07-29 RX ORDER — SUCRALFATE 1 G/10ML
10 ML 1 HOUR BEFORE MEALS AND AT BEDTIME ON AN EMPTY STOMACH SUSPENSION ORAL
Qty: 1200 | Status: ACTIVE | COMMUNITY
Start: 2025-07-23

## 2025-07-29 RX ORDER — ATORVASTATIN CALCIUM 40 MG/1
1 TABLET TABLET, FILM COATED ORAL ONCE A DAY
Status: ACTIVE | COMMUNITY

## 2025-08-21 ENCOUNTER — OFFICE VISIT (OUTPATIENT)
Dept: URBAN - METROPOLITAN AREA CLINIC 74 | Facility: CLINIC | Age: 70
End: 2025-08-21
Payer: MEDICARE

## 2025-08-21 ENCOUNTER — LAB OUTSIDE AN ENCOUNTER (OUTPATIENT)
Dept: URBAN - METROPOLITAN AREA CLINIC 74 | Facility: CLINIC | Age: 70
End: 2025-08-21

## 2025-08-21 DIAGNOSIS — K57.30 DIVERTICULOSIS OF COLON WITHOUT DIVERTICULITIS: ICD-10-CM

## 2025-08-21 DIAGNOSIS — K21.9 GERD WITHOUT ESOPHAGITIS: ICD-10-CM

## 2025-08-21 DIAGNOSIS — F41.9 ANXIETY: ICD-10-CM

## 2025-08-21 DIAGNOSIS — K59.01 SLOW TRANSIT CONSTIPATION: ICD-10-CM

## 2025-08-21 DIAGNOSIS — R10.13 EPIGASTRIC BURNING SENSATION: ICD-10-CM

## 2025-08-21 DIAGNOSIS — E66.3 OVERWEIGHT (BMI 25.0-29.9): ICD-10-CM

## 2025-08-21 DIAGNOSIS — G47.30 SLEEP APNEA, UNSPECIFIED TYPE: ICD-10-CM

## 2025-08-21 DIAGNOSIS — R19.4 CHANGE IN BOWEL HABIT: ICD-10-CM

## 2025-08-21 DIAGNOSIS — Z83.71 FAMILY HISTORY OF COLONIC POLYPS: ICD-10-CM

## 2025-08-21 DIAGNOSIS — K63.5 HYPERPLASTIC POLYP OF TRANSVERSE COLON: ICD-10-CM

## 2025-08-21 DIAGNOSIS — K76.89 LIVER CYST: ICD-10-CM

## 2025-08-21 DIAGNOSIS — K66.8 MESENTERIC CYST: ICD-10-CM

## 2025-08-21 DIAGNOSIS — K29.60 CHEMICAL GASTRITIS: ICD-10-CM

## 2025-08-21 DIAGNOSIS — Z87.11 HISTORY OF GASTRIC ULCER: ICD-10-CM

## 2025-08-21 DIAGNOSIS — R13.12 OROPHARYNGEAL DYSPHAGIA: ICD-10-CM

## 2025-08-21 DIAGNOSIS — F45.8 AEROPHAGIA: ICD-10-CM

## 2025-08-21 PROBLEM — 30693006: Status: ACTIVE | Noted: 2025-08-21

## 2025-08-21 PROBLEM — 48694002: Status: ACTIVE | Noted: 2025-08-21

## 2025-08-21 PROBLEM — 71457002: Status: ACTIVE | Noted: 2025-08-21

## 2025-08-21 PROCEDURE — 99213 OFFICE O/P EST LOW 20 MIN: CPT | Performed by: INTERNAL MEDICINE

## 2025-08-21 RX ORDER — PANTOPRAZOLE SODIUM 40 MG/1
1 TABLET TABLET, DELAYED RELEASE ORAL TWICE A DAY
Qty: 180 | Refills: 0

## 2025-08-21 RX ORDER — ATORVASTATIN CALCIUM 40 MG/1
1 TABLET TABLET, FILM COATED ORAL ONCE A DAY
Status: ACTIVE | COMMUNITY

## 2025-08-21 RX ORDER — FLUTICASONE PROPIONATE 50 UG/1
1 SPRAY IN EACH NOSTRIL SPRAY, METERED NASAL ONCE A DAY
Status: ACTIVE | COMMUNITY

## 2025-08-21 RX ORDER — SUCRALFATE 1 G/10ML
10 ML 1 HOUR BEFORE MEALS AND AT BEDTIME ON AN EMPTY STOMACH SUSPENSION ORAL
Qty: 1200 | Status: ACTIVE | COMMUNITY
Start: 2025-07-23

## 2025-08-21 RX ORDER — SUCRALFATE 1 G/10ML
10 ML 1 HOUR BEFORE MEALS AND AT BEDTIME ON AN EMPTY STOMACH SUSPENSION ORAL
Qty: 3600 | Refills: 0
Start: 2025-07-23

## 2025-08-21 RX ORDER — PANTOPRAZOLE SODIUM 40 MG/1
1 TABLET TABLET, DELAYED RELEASE ORAL TWICE A DAY
Qty: 180 TABLET | Refills: 1 | Status: ACTIVE | COMMUNITY